# Patient Record
Sex: FEMALE | Employment: FULL TIME | URBAN - METROPOLITAN AREA
[De-identification: names, ages, dates, MRNs, and addresses within clinical notes are randomized per-mention and may not be internally consistent; named-entity substitution may affect disease eponyms.]

---

## 2022-09-06 ENCOUNTER — OFFICE VISIT (OUTPATIENT)
Dept: SURGERY | Facility: CLINIC | Age: 47
End: 2022-09-06
Payer: COMMERCIAL

## 2022-09-06 VITALS
HEIGHT: 64 IN | BODY MASS INDEX: 25.61 KG/M2 | RESPIRATION RATE: 18 BRPM | SYSTOLIC BLOOD PRESSURE: 136 MMHG | WEIGHT: 150 LBS | HEART RATE: 98 BPM | DIASTOLIC BLOOD PRESSURE: 82 MMHG | OXYGEN SATURATION: 96 %

## 2022-09-06 DIAGNOSIS — D05.12 DUCTAL CARCINOMA IN SITU (DCIS) OF LEFT BREAST: Primary | ICD-10-CM

## 2022-09-06 PROCEDURE — 99205 OFFICE O/P NEW HI 60 MIN: CPT | Performed by: SURGERY

## 2022-09-06 RX ORDER — CLINDAMYCIN AND BENZOYL PEROXIDE 10; 50 MG/G; MG/G
GEL TOPICAL 2 TIMES DAILY
Status: ON HOLD | COMMUNITY
End: 2022-10-05

## 2022-09-06 NOTE — PROGRESS NOTES
[facility] Breast Surgery Consultation    HPI:   Selma Alonzo is a 47 year old female who is seen in consultation at the request of herself for evaluation of left breast DCIS, ER positive.     She had a diagnostic mammogram on 7/21/2022 following a screening mammogram which revealed new pleomorphic calcifications n a fine linear branching pattern in the upper outer breast. She then had a stereotactic biopsy which revealed DCIS. She had a breast MRI on 8/1/2022 which revealed a hematoma at the biopsy site measuring 2.2cm with associated mild skin thickening thought to be post procedural.     She reports no breast concerns prior to her screening mammogram. She had met with Dr. Littlejohn in [place] and is trying to decide which surgical option would be best for her. She is here with her friend Mary Kay Chang (prior pt of [facility] 2018). She has genetic testing pending - expecting results next week or so. She did also meet with Dr. Fry and discussed reconstruction options. She was nervous about this and potential side effects/complications and is not sure if she would like lumpectomy vs mastectomy at this time.     Hormonal history:  menarche 14, 3 children (16,18, 20), 1st at age 27,  Pre menopausal, 10 years OCP use, no HRT, no fertility treatment.     Family history of breast cancer: No  Family history of ovarian cancer:  No  Family history of colon cancer: yes, paternal grandfather  Family history of prostate cancer: No      Past Medical History:  reviewed      Current Outpatient Medications:      clindamycin-benzoyl peroxide (BENZACLIN) 1-5 % external gel, Apply topically 2 times daily, Disp: , Rfl:     Past Surgical History:  Hip surgery, labral tear 2019  Ankle surgery  Microdiscectomy L4-5  Kidney stones        Allergies   Allergen Reactions     Rosa-Kit Bee Sting         Social History:  Social History     Socioeconomic History     Marital status:      Spouse name: Not on file     Number of children:  "Not on file     Years of education: Not on file     Highest education level: Not on file   Occupational History     Not on file   Tobacco Use     Smoking status: Never Smoker     Smokeless tobacco: Never Used   Substance and Sexual Activity     Alcohol use: Not on file     Drug use: Never     Sexual activity: Not on file   Other Topics Concern     Not on file   Social History Narrative     Not on file     Social Determinants of Health     Financial Resource Strain: Not on file   Food Insecurity: Not on file   Transportation Needs: Not on file   Physical Activity: Not on file   Stress: Not on file   Social Connections: Not on file   Intimate Partner Violence: Not on file   Housing Stability: Not on file        ROS:  The 10 point review of systems is negative other than noted in the HPI and above.    PE:  Vitals: /82   Pulse 98   Resp 18   Ht 1.626 m (5' 4\")   Wt 68 kg (150 lb)   SpO2 96%   Breastfeeding No   BMI 25.75 kg/m    General appearance: well-nourished, sitting comfortably, no apparent distress  Psych: normal affect, pleasant  HEENT:  Head normocephalic and atraumatic, pupils equal and round, conjunctivae clear, mucous membranes moist, external ears and nose normal  Neck: Supple without thyromegaly or masses  Lungs: Respirations unlabored  Lymphatic: No cervical, or supraclavicular lymphadenopathy  Extremities: Without edema  Musculoskeletal:  Normal station and gait  Neurologic: nonfocal, grossly intact times four extremities, alert and oriented times three  Psychiatric: Mood and affect are appropriate  Skin: Without lesions or rashes    Breast:  A bilateral breast exam was performed in the supine position.. Bilateral breasts were palpated in a circumferential clockwise fashion including the supraclavicular and axillary areas.   Breasts are symmetric. Skin is normal. Nipples everted. There is a moderate sized hematoma in the left upper outer breast measuring 3cm in size. No additional masses in " either breast. Tissue is heterogeneously dense    Lymph:       No supraclavicular/infraclavicular adenopathy.   Axillary adenopathy: none    Assessment:  left breast high grade DCIS, ER positive in the upper outer breast spanning 2-3cm    Plan:   Selma Alonzo is a 47 year old female has newly diagnosed left breast cancer.  I reviewed the imaging and pathology reports with her and her friend and explained the findings.  We talked about the fact that this is ductal carcinoma in situ, or noninvasive breast cancer  that is 2-3cm  in size and was found on screening mammogram.  We discussed the receptor status. We discussed that breast cancer is treated in a multidisciplinary fashion and she has already met with Dr. Espinal.     We next discussed the surgical options for treatment.  I described the procedures for RFID tag localized lumpectomy or mastectomy with sentinel lymph node biopsy, possible axillary node dissection including the details of the procedures, the risks, anesthesia and expected recovery.      I reviewed the data regarding lumpectomy and radiation vs mastectomy that shows that the local recurrence risk is slightly higher for lumpectomy and radiation vs mastectomy (3-5% vs. 1-2%), but the survival at 20 years is the same.  I also explained that since this is a small tumor and was not palpable on clinical breast exam prior to the biopsy, I would ask radiology to place a radiofrequency ID tag pre-operatively for localization. We discussed the role of oncoplastic lumpectomy. We discussed the risk of margin positivity following partial mastectomy surgery and need for possible return to the operating room for additional procedures if margins are positive.     I advised that lymph node biopsy is recommended whenever we are dealing with invasive breast cancer and described the procedure for sentinel lymph node biopsy.  I would not recommend a SLNB if proceeding with lumpectomy and if there is an invasive  component identified would recommend return to the OR in that case for SLNB but avoiding possible lymphedema if possible is my recommendation.     We talked about post-lumpectomy radiation, the course and usual side effects. We discussed that with lumpectomy, radiation is typically recommended to decrease risk of recurrence. It may be necessary following mastectomy depending on final pathology and if taty involvement is present.     We also talked about post-mastectomy reconstruction and the stages involved. We also discussed the various types of mastectomy, including total, skin-sparing, and nipple-sparing mastectomy.  We reviewed that the nipple-sparing technique is cosmetic; sensation and contractility will likely be lost.  Selma  is a candidate for nipple-sparing mastectomy from an oncologic perspective.  The option of having immediate versus delayed reconstruction was also discussed.   We reviewed that the advantages of immediate reconstruction includes superior cosmetics, as the skin is preserved.      We did discuss that if she had bilateral mastectomies and no invasive component identified, I do not expect she would need any hormone blocking medication in an adjuvant setting.     In addition, I have recommended genetic counseling, this is pending currently.        Plan:   Pt to decide which surgical option she would like to proceed with and will let my  know if she would like to schedule      60 minutes total time spent on the date of this encounter doing: chart review, review of test results, patient visit, physical exam, education, counseling, developing plan of care, and documenting.    Meme Ortega MD      Please route or send letter to:  Primary Care Provider (PCP) and Referring Provider

## 2022-09-06 NOTE — NURSING NOTE
Breast Nurse Care Coordination:      I introduced self to patient, and explained my role of breast nurse coordinator. I accompanied Selma to her surgical consultation on 9/6/22 with Dr. Ortega at the [facility] [place].      Selma was given the new breast cancer patient packet which includes educational material and support resources such as [employer], American Cancer Society, Caring [employer], Fighting Cancer through Diet and Lifestyle, [employer], [employer], Pathways,  and the [facility] Breast Cancer Support Group.  I also enclosed a copy of her left breast biopsy pathology report(7/26/22).       At the end of the consultation, we reviewed Selma's plan of care and education.  The plan is for patient to schedule surgery. She has met with Dr. Espinal at [place] and is seeing Dr. Ortega today as a second opinion. She is leaning towards a left lumpectomy (depending on her genetics which is pending). She will reach out to our office if she decides to purse surgery here. I ifnformed her for surgery she will need a COVID test 1-2 days before surgery (at home & take a picture to show pre-op nurse day of surgery) and a pre-op exam with her primary care provider within 30 days before surgery.     I gave patient Abril educational materials regarding Exercises After Breast Surgery, [place] Lymph Node Biopsy, and Lumpectomy.  Informed patient for lumpectomy surgery, she will want to have two good supportive sports bras that open in the front to wear the 2 weeks following her surgery. I gave patient information on different options to purchase sports bras.      I answered her questions and encouraged patient to call me back with any future questions or concerns.  Selma has my contact information, and knows to contact me in the future with any questions or concerns.     Anjelica Fry 9/6/2022 3:35 PM      Anjelica Fry RN BSN  Breast Care Nurse Coordinator  [facility]  [facility]  [facility] [place]  [phone]

## 2022-09-06 NOTE — LETTER
September 7, 2022          Gabriela Otto MD      RE:   Selma Alonzo 1975      Dear Colleague,    Thank you for referring your patient, Selma Alonzo, to [facility]. Please see a copy of my visit note below.    Selma Alonzo is a 47 year old female who is seen in consultation at the request of herself for evaluation of left breast DCIS, ER positive.      She had a diagnostic mammogram on 7/21/2022 following a screening mammogram which revealed new pleomorphic calcifications n a fine linear branching pattern in the upper outer breast. She then had a stereotactic biopsy which revealed DCIS. She had a breast MRI on 8/1/2022 which revealed a hematoma at the biopsy site measuring 2.2cm with associated mild skin thickening thought to be post procedural.      She reports no breast concerns prior to her screening mammogram. She had met with Dr. Littlejohn in [place] and is trying to decide which surgical option would be best for her. She is here with her friend Mary Kay Chang (prior pt of [facility] 2018). She has genetic testing pending - expecting results next week or so. She did also meet with Dr. Fry and discussed reconstruction options. She was nervous about this and potential side effects/complications and is not sure if she would like lumpectomy vs mastectomy at this time.      Hormonal history:  menarche 14, 3 children (16,18, 20), 1st at age 27,  Pre menopausal, 10 years OCP use, no HRT, no fertility treatment.      Family history of breast cancer: No  Family history of ovarian cancer:  No  Family history of colon cancer: yes, paternal grandfather  Family history of prostate cancer: No        Past Medical History:  reviewed        Current Outpatient Medications:      clindamycin-benzoyl peroxide (BENZACLIN) 1-5 % external gel, Apply topically 2 times daily, Disp: , Rfl:      Past Surgical History:  Hip surgery, labral tear 2019  Ankle surgery  Microdiscectomy L4-5  Kidney stones     "             Allergies   Allergen Reactions     Rosa-Kit Bee Sting                       Social History:  Social History   Social History            Socioeconomic History     Marital status:        Spouse name: Not on file     Number of children: Not on file     Years of education: Not on file     Highest education level: Not on file   Occupational History     Not on file   Tobacco Use     Smoking status: Never Smoker     Smokeless tobacco: Never Used   Substance and Sexual Activity     Alcohol use: Not on file     Drug use: Never     Sexual activity: Not on file   Other Topics Concern     Not on file   Social History Narrative     Not on file      Social Determinants of Health      Financial Resource Strain: Not on file   Food Insecurity: Not on file   Transportation Needs: Not on file   Physical Activity: Not on file   Stress: Not on file   Social Connections: Not on file   Intimate Partner Violence: Not on file   Housing Stability: Not on file                        ROS:  The 10 point review of systems is negative other than noted in the HPI and above.     PE:  Vitals: /82   Pulse 98   Resp 18   Ht 1.626 m (5' 4\")   Wt 68 kg (150 lb)   SpO2 96%   Breastfeeding No   BMI 25.75 kg/m    General appearance: well-nourished, sitting comfortably, no apparent distress  Psych: normal affect, pleasant  HEENT:  Head normocephalic and atraumatic, pupils equal and round, conjunctivae clear, mucous membranes moist, external ears and nose normal  Neck: Supple without thyromegaly or masses  Lungs: Respirations unlabored  Lymphatic: No cervical, or supraclavicular lymphadenopathy  Extremities: Without edema  Musculoskeletal:  Normal station and gait  Neurologic: nonfocal, grossly intact times four extremities, alert and oriented times three  Psychiatric: Mood and affect are appropriate  Skin: Without lesions or rashes     Breast:  A bilateral breast exam was performed in the supine position.. Bilateral breasts " were palpated in a circumferential clockwise fashion including the supraclavicular and axillary areas.   Breasts are symmetric. Skin is normal. Nipples everted. There is a moderate sized hematoma in the left upper outer breast measuring 3cm in size. No additional masses in either breast. Tissue is heterogeneously dense     Lymph:                            No supraclavicular/infraclavicular adenopathy.               Axillary adenopathy: none     Assessment:  left breast high grade DCIS, ER positive in the upper outer breast spanning 2-3cm     Plan:   Selma Alonzo is a 47 year old female has newly diagnosed left breast cancer.  I reviewed the imaging and pathology reports with her and her friend and explained the findings.  We talked about the fact that this is ductal carcinoma in situ, or noninvasive breast cancer  that is 2-3cm  in size and was found on screening mammogram.  We discussed the receptor status. We discussed that breast cancer is treated in a multidisciplinary fashion and she has already met with Dr. Espinal.      We next discussed the surgical options for treatment.  I described the procedures for RFID tag localized lumpectomy or mastectomy with sentinel lymph node biopsy, possible axillary node dissection including the details of the procedures, the risks, anesthesia and expected recovery.       I reviewed the data regarding lumpectomy and radiation vs mastectomy that shows that the local recurrence risk is slightly higher for lumpectomy and radiation vs mastectomy (3-5% vs. 1-2%), but the survival at 20 years is the same.  I also explained that since this is a small tumor and was not palpable on clinical breast exam prior to the biopsy, I would ask radiology to place a radiofrequency ID tag pre-operatively for localization. We discussed the role of oncoplastic lumpectomy. We discussed the risk of margin positivity following partial mastectomy surgery and need for possible return to the operating  room for additional procedures if margins are positive.      I advised that lymph node biopsy is recommended whenever we are dealing with invasive breast cancer and described the procedure for sentinel lymph node biopsy.  I would not recommend a SLNB if proceeding with lumpectomy and if there is an invasive component identified would recommend return to the OR in that case for SLNB but avoiding possible lymphedema if possible is my recommendation.      We talked about post-lumpectomy radiation, the course and usual side effects. We discussed that with lumpectomy, radiation is typically recommended to decrease risk of recurrence. It may be necessary following mastectomy depending on final pathology and if taty involvement is present.      We also talked about post-mastectomy reconstruction and the stages involved. We also discussed the various types of mastectomy, including total, skin-sparing, and nipple-sparing mastectomy.  We reviewed that the nipple-sparing technique is cosmetic; sensation and contractility will likely be lost.  Selma  is a candidate for nipple-sparing mastectomy from an oncologic perspective.  The option of having immediate versus delayed reconstruction was also discussed.   We reviewed that the advantages of immediate reconstruction includes superior cosmetics, as the skin is preserved.       We did discuss that if she had bilateral mastectomies and no invasive component identified, I do not expect she would need any hormone blocking medication in an adjuvant setting.      In addition, I have recommended genetic counseling, this is pending currently.         Plan:   Pt to decide which surgical option she would like to proceed with and will let my  know if she would like to schedule    Again, thank you for allowing me to participate in the care of your patient.      Sincerely,      Meme Ortega MD

## 2022-09-08 ENCOUNTER — PREP FOR PROCEDURE (OUTPATIENT)
Dept: SURGERY | Facility: CLINIC | Age: 47
End: 2022-09-08

## 2022-09-08 DIAGNOSIS — D05.12 DUCTAL CARCINOMA IN SITU (DCIS) OF LEFT BREAST: Primary | ICD-10-CM

## 2022-09-09 ENCOUNTER — TELEPHONE (OUTPATIENT)
Dept: SURGERY | Facility: CLINIC | Age: 47
End: 2022-09-09

## 2022-09-09 NOTE — TELEPHONE ENCOUNTER
Type of surgery: RFID localized left breast lumpectomy  Location of surgery: [facility]  Date and time of surgery: 10/5/22 at 9:30am  Surgeon: Dr. Meme Ortega  Pre-Op Appt Date: patient to schedule  Post-Op Appt Date: patient to schedule   Packet sent out: Yes  Pre-cert/Authorization completed:  Not Applicable  Date: 9/9/22

## 2022-09-30 ENCOUNTER — HOSPITAL ENCOUNTER (OUTPATIENT)
Dept: MAMMOGRAPHY | Facility: CLINIC | Age: 47
Discharge: HOME | End: 2022-09-30
Attending: SURGERY
Payer: COMMERCIAL

## 2022-09-30 DIAGNOSIS — R92.1 BREAST CALCIFICATION, LEFT: ICD-10-CM

## 2022-09-30 DIAGNOSIS — D05.12 DUCTAL CARCINOMA IN SITU (DCIS) OF LEFT BREAST: ICD-10-CM

## 2022-09-30 PROCEDURE — 250N000009 HC RX 250: Performed by: RADIOLOGY

## 2022-09-30 PROCEDURE — A4648 IMPLANTABLE TISSUE MARKER: HCPCS

## 2022-09-30 PROCEDURE — 77065 DX MAMMO INCL CAD UNI: CPT | Mod: LT

## 2022-09-30 PROCEDURE — 999N000065 MA POST PROCEDURE LEFT

## 2022-09-30 RX ADMIN — LIDOCAINE HYDROCHLORIDE 5 ML: 10 INJECTION, SOLUTION INFILTRATION; PERINEURAL at 08:05

## 2022-10-03 ENCOUNTER — TELEPHONE (OUTPATIENT)
Dept: SURGERY | Facility: CLINIC | Age: 47
End: 2022-10-03

## 2022-10-03 NOTE — TELEPHONE ENCOUNTER
Breast Care Nurse Coordination:  Preoperative call placed to Selma today to assess her needs, and check in on whether she has any questions or concerns regarding her upcoming breast surgery.    Patient was recently diagnosed with left DCIS and is scheduled to have a left breast lumpetomy by Dr. Meme Ortega on 10/5/22 at the [facility].    Selma states she has had a preop physical exam with her primary provider- on 9/30/22, and has already picked up her sports bras.  I informed patient to bring one of the sports bras with her to the hospital the day of surgery.  We discussed the day of surgery and what to expect the days and weeks following.  I informed patient to wear loose, comfortable fitted clothing.     I answered all of patient's questions completely and thoroughly to her satisfaction.  I informed patient that I will reach out to her next week to check in with her, or she can always call me back with any questions or concerns.  Patient verbalized understanding and agrees with the plan of care.      She is scheduled for a post op on 10/25/22 @ 12:15pm (arrive 12pm) with Dr. Meme [facility]ga [facility]    She would like to see Dr. Espinal (medical oncology) after surgery and is set to see him on October 13th for now.   She has met with Dr. Gotti (Radiation oncology) in [place] and plans on having her radiation treatment done there.      Anjelica Fry 10/3/2022 10:17 AM      Anjelica Fry RN BSN  Breast Care Nurse Coordinator  [facility] [place] [place]  [phone]

## 2022-10-04 ENCOUNTER — ANESTHESIA EVENT (OUTPATIENT)
Dept: SURGERY | Facility: CLINIC | Age: 47
End: 2022-10-04
Payer: COMMERCIAL

## 2022-10-04 RX ORDER — TRIAMCINOLONE ACETONIDE 1 MG/ML
LOTION TOPICAL 3 TIMES DAILY
Status: ON HOLD | COMMUNITY
End: 2022-10-05

## 2022-10-04 RX ORDER — EPINEPHRINE 0.15 MG/.3ML
0.15 INJECTION INTRAMUSCULAR PRN
COMMUNITY

## 2022-10-04 RX ORDER — BETAMETHASONE DIPROPIONATE 0.5 MG/G
CREAM TOPICAL 2 TIMES DAILY
COMMUNITY

## 2022-10-05 ENCOUNTER — HOSPITAL ENCOUNTER (OUTPATIENT)
Facility: CLINIC | Age: 47
Discharge: HOME | End: 2022-10-05
Attending: SURGERY | Admitting: SURGERY
Payer: COMMERCIAL

## 2022-10-05 ENCOUNTER — HOSPITAL ENCOUNTER (OUTPATIENT)
Dept: MAMMOGRAPHY | Facility: CLINIC | Age: 47
Discharge: HOME | End: 2022-10-05
Attending: SURGERY
Payer: COMMERCIAL

## 2022-10-05 ENCOUNTER — ANESTHESIA (OUTPATIENT)
Dept: SURGERY | Facility: CLINIC | Age: 47
End: 2022-10-05
Payer: COMMERCIAL

## 2022-10-05 ENCOUNTER — APPOINTMENT (OUTPATIENT)
Dept: SURGERY | Facility: PHYSICIAN GROUP | Age: 47
End: 2022-10-05
Payer: COMMERCIAL

## 2022-10-05 VITALS
TEMPERATURE: 98.4 F | BODY MASS INDEX: 25.1 KG/M2 | OXYGEN SATURATION: 96 % | RESPIRATION RATE: 19 BRPM | SYSTOLIC BLOOD PRESSURE: 128 MMHG | HEIGHT: 64 IN | WEIGHT: 147 LBS | HEART RATE: 88 BPM | DIASTOLIC BLOOD PRESSURE: 90 MMHG

## 2022-10-05 DIAGNOSIS — G89.18 POSTOPERATIVE PAIN: Primary | ICD-10-CM

## 2022-10-05 DIAGNOSIS — D05.12 DUCTAL CARCINOMA IN SITU (DCIS) OF LEFT BREAST: ICD-10-CM

## 2022-10-05 PROCEDURE — 250N000011 HC RX IP 250 OP 636: Performed by: SURGERY

## 2022-10-05 PROCEDURE — 370N000017 HC ANESTHESIA TECHNICAL FEE, PER MIN: Performed by: SURGERY

## 2022-10-05 PROCEDURE — 19301 PARTIAL MASTECTOMY: CPT | Mod: LT | Performed by: SURGERY

## 2022-10-05 PROCEDURE — 250N000025 HC SEVOFLURANE, PER MIN: Performed by: SURGERY

## 2022-10-05 PROCEDURE — 250N000013 HC RX MED GY IP 250 OP 250 PS 637: Performed by: ANESTHESIOLOGY

## 2022-10-05 PROCEDURE — 250N000011 HC RX IP 250 OP 636: Performed by: NURSE ANESTHETIST, CERTIFIED REGISTERED

## 2022-10-05 PROCEDURE — 258N000003 HC RX IP 258 OP 636: Performed by: ANESTHESIOLOGY

## 2022-10-05 PROCEDURE — 999N000141 HC STATISTIC PRE-PROCEDURE NURSING ASSESSMENT: Performed by: SURGERY

## 2022-10-05 PROCEDURE — 710N000009 HC RECOVERY PHASE 1, LEVEL 1, PER MIN: Performed by: SURGERY

## 2022-10-05 PROCEDURE — 250N000011 HC RX IP 250 OP 636: Performed by: ANESTHESIOLOGY

## 2022-10-05 PROCEDURE — 258N000003 HC RX IP 258 OP 636: Performed by: NURSE ANESTHETIST, CERTIFIED REGISTERED

## 2022-10-05 PROCEDURE — 88307 TISSUE EXAM BY PATHOLOGIST: CPT | Mod: 26 | Performed by: PATHOLOGY

## 2022-10-05 PROCEDURE — 88305 TISSUE EXAM BY PATHOLOGIST: CPT | Mod: TC | Performed by: SURGERY

## 2022-10-05 PROCEDURE — 272N000001 HC OR GENERAL SUPPLY STERILE: Performed by: SURGERY

## 2022-10-05 PROCEDURE — 360N000081 HC SURGERY LEVEL 1 W/ FLUORO, PER MIN: Performed by: SURGERY

## 2022-10-05 PROCEDURE — 88342 IMHCHEM/IMCYTCHM 1ST ANTB: CPT | Mod: 26 | Performed by: PATHOLOGY

## 2022-10-05 PROCEDURE — 710N000012 HC RECOVERY PHASE 2, PER MINUTE: Performed by: SURGERY

## 2022-10-05 PROCEDURE — 88341 IMHCHEM/IMCYTCHM EA ADD ANTB: CPT | Mod: 26 | Performed by: PATHOLOGY

## 2022-10-05 PROCEDURE — 250N000009 HC RX 250: Performed by: NURSE ANESTHETIST, CERTIFIED REGISTERED

## 2022-10-05 PROCEDURE — 88305 TISSUE EXAM BY PATHOLOGIST: CPT | Mod: 26 | Performed by: PATHOLOGY

## 2022-10-05 PROCEDURE — 250N000009 HC RX 250: Performed by: SURGERY

## 2022-10-05 PROCEDURE — 999N000104 MA BREAST SPECIMEN LEFT OR

## 2022-10-05 RX ORDER — SODIUM CHLORIDE, SODIUM LACTATE, POTASSIUM CHLORIDE, CALCIUM CHLORIDE 600; 310; 30; 20 MG/100ML; MG/100ML; MG/100ML; MG/100ML
INJECTION, SOLUTION INTRAVENOUS CONTINUOUS
Status: DISCONTINUED | OUTPATIENT
Start: 2022-10-05 | End: 2022-10-05 | Stop reason: HOSPADM

## 2022-10-05 RX ORDER — HYDROCODONE BITARTRATE AND ACETAMINOPHEN 5; 325 MG/1; MG/1
1-2 TABLET ORAL EVERY 4 HOURS PRN
Qty: 12 TABLET | Refills: 0 | Status: SHIPPED | OUTPATIENT
Start: 2022-10-05 | End: 2023-06-02

## 2022-10-05 RX ORDER — ONDANSETRON 2 MG/ML
4 INJECTION INTRAMUSCULAR; INTRAVENOUS EVERY 30 MIN PRN
Status: DISCONTINUED | OUTPATIENT
Start: 2022-10-05 | End: 2022-10-05 | Stop reason: HOSPADM

## 2022-10-05 RX ORDER — FENTANYL CITRATE 0.05 MG/ML
50 INJECTION, SOLUTION INTRAMUSCULAR; INTRAVENOUS EVERY 5 MIN PRN
Status: DISCONTINUED | OUTPATIENT
Start: 2022-10-05 | End: 2022-10-05 | Stop reason: HOSPADM

## 2022-10-05 RX ORDER — CEFAZOLIN SODIUM/WATER 2 G/20 ML
2 SYRINGE (ML) INTRAVENOUS
Status: COMPLETED | OUTPATIENT
Start: 2022-10-05 | End: 2022-10-05

## 2022-10-05 RX ORDER — PROPOFOL 10 MG/ML
INJECTION, EMULSION INTRAVENOUS PRN
Status: DISCONTINUED | OUTPATIENT
Start: 2022-10-05 | End: 2022-10-05

## 2022-10-05 RX ORDER — MEPERIDINE HYDROCHLORIDE 25 MG/ML
12.5 INJECTION INTRAMUSCULAR; INTRAVENOUS; SUBCUTANEOUS
Status: DISCONTINUED | OUTPATIENT
Start: 2022-10-05 | End: 2022-10-05 | Stop reason: HOSPADM

## 2022-10-05 RX ORDER — AMOXICILLIN 250 MG
1-2 CAPSULE ORAL 2 TIMES DAILY PRN
Qty: 15 TABLET | Refills: 0 | Status: SHIPPED | OUTPATIENT
Start: 2022-10-05 | End: 2023-06-02

## 2022-10-05 RX ORDER — CEFAZOLIN SODIUM/WATER 2 G/20 ML
2 SYRINGE (ML) INTRAVENOUS SEE ADMIN INSTRUCTIONS
Status: DISCONTINUED | OUTPATIENT
Start: 2022-10-05 | End: 2022-10-05 | Stop reason: HOSPADM

## 2022-10-05 RX ORDER — FENTANYL CITRATE 50 UG/ML
INJECTION, SOLUTION INTRAMUSCULAR; INTRAVENOUS PRN
Status: DISCONTINUED | OUTPATIENT
Start: 2022-10-05 | End: 2022-10-05

## 2022-10-05 RX ORDER — FENTANYL CITRATE 0.05 MG/ML
25 INJECTION, SOLUTION INTRAMUSCULAR; INTRAVENOUS
Status: CANCELLED | OUTPATIENT
Start: 2022-10-05

## 2022-10-05 RX ORDER — DEXAMETHASONE SODIUM PHOSPHATE 4 MG/ML
INJECTION, SOLUTION INTRA-ARTICULAR; INTRALESIONAL; INTRAMUSCULAR; INTRAVENOUS; SOFT TISSUE PRN
Status: DISCONTINUED | OUTPATIENT
Start: 2022-10-05 | End: 2022-10-05

## 2022-10-05 RX ORDER — LIDOCAINE HYDROCHLORIDE 20 MG/ML
INJECTION, SOLUTION INFILTRATION; PERINEURAL PRN
Status: DISCONTINUED | OUTPATIENT
Start: 2022-10-05 | End: 2022-10-05

## 2022-10-05 RX ORDER — PROPOFOL 10 MG/ML
INJECTION, EMULSION INTRAVENOUS CONTINUOUS PRN
Status: DISCONTINUED | OUTPATIENT
Start: 2022-10-05 | End: 2022-10-05

## 2022-10-05 RX ORDER — HYDROCODONE BITARTRATE AND ACETAMINOPHEN 5; 325 MG/1; MG/1
1-2 TABLET ORAL
Status: DISCONTINUED | OUTPATIENT
Start: 2022-10-05 | End: 2022-10-05 | Stop reason: HOSPADM

## 2022-10-05 RX ORDER — HYDROMORPHONE HCL IN WATER/PF 6 MG/30 ML
0.2 PATIENT CONTROLLED ANALGESIA SYRINGE INTRAVENOUS EVERY 5 MIN PRN
Status: DISCONTINUED | OUTPATIENT
Start: 2022-10-05 | End: 2022-10-05 | Stop reason: HOSPADM

## 2022-10-05 RX ORDER — MAGNESIUM HYDROXIDE 1200 MG/15ML
LIQUID ORAL PRN
Status: DISCONTINUED | OUTPATIENT
Start: 2022-10-05 | End: 2022-10-05 | Stop reason: HOSPADM

## 2022-10-05 RX ORDER — ONDANSETRON 2 MG/ML
INJECTION INTRAMUSCULAR; INTRAVENOUS PRN
Status: DISCONTINUED | OUTPATIENT
Start: 2022-10-05 | End: 2022-10-05

## 2022-10-05 RX ORDER — ONDANSETRON 4 MG/1
4 TABLET, ORALLY DISINTEGRATING ORAL EVERY 30 MIN PRN
Status: DISCONTINUED | OUTPATIENT
Start: 2022-10-05 | End: 2022-10-05 | Stop reason: HOSPADM

## 2022-10-05 RX ORDER — OXYCODONE HYDROCHLORIDE 5 MG/1
5 TABLET ORAL EVERY 4 HOURS PRN
Status: DISCONTINUED | OUTPATIENT
Start: 2022-10-05 | End: 2022-10-05 | Stop reason: HOSPADM

## 2022-10-05 RX ADMIN — PROPOFOL 200 MG: 10 INJECTION, EMULSION INTRAVENOUS at 09:40

## 2022-10-05 RX ADMIN — PHENYLEPHRINE HYDROCHLORIDE 50 MCG: 10 INJECTION INTRAVENOUS at 10:36

## 2022-10-05 RX ADMIN — PHENYLEPHRINE HYDROCHLORIDE 50 MCG: 10 INJECTION INTRAVENOUS at 10:17

## 2022-10-05 RX ADMIN — Medication 2 G: at 09:32

## 2022-10-05 RX ADMIN — FENTANYL CITRATE 50 MCG: 50 INJECTION, SOLUTION INTRAMUSCULAR; INTRAVENOUS at 09:40

## 2022-10-05 RX ADMIN — PROPOFOL 30 MG: 10 INJECTION, EMULSION INTRAVENOUS at 09:59

## 2022-10-05 RX ADMIN — PROPOFOL 100 MCG/KG/MIN: 10 INJECTION, EMULSION INTRAVENOUS at 09:42

## 2022-10-05 RX ADMIN — LIDOCAINE HYDROCHLORIDE 100 MG: 20 INJECTION, SOLUTION INFILTRATION; PERINEURAL at 09:40

## 2022-10-05 RX ADMIN — FENTANYL CITRATE 25 MCG: 50 INJECTION, SOLUTION INTRAMUSCULAR; INTRAVENOUS at 09:59

## 2022-10-05 RX ADMIN — OXYCODONE HYDROCHLORIDE 5 MG: 5 TABLET ORAL at 11:37

## 2022-10-05 RX ADMIN — SODIUM CHLORIDE, POTASSIUM CHLORIDE, SODIUM LACTATE AND CALCIUM CHLORIDE: 600; 310; 30; 20 INJECTION, SOLUTION INTRAVENOUS at 08:19

## 2022-10-05 RX ADMIN — PHENYLEPHRINE HYDROCHLORIDE 50 MCG: 10 INJECTION INTRAVENOUS at 09:53

## 2022-10-05 RX ADMIN — ONDANSETRON 4 MG: 2 INJECTION INTRAMUSCULAR; INTRAVENOUS at 10:28

## 2022-10-05 RX ADMIN — MIDAZOLAM 2 MG: 1 INJECTION INTRAMUSCULAR; INTRAVENOUS at 09:34

## 2022-10-05 RX ADMIN — DEXAMETHASONE SODIUM PHOSPHATE 4 MG: 4 INJECTION, SOLUTION INTRA-ARTICULAR; INTRALESIONAL; INTRAMUSCULAR; INTRAVENOUS; SOFT TISSUE at 09:49

## 2022-10-05 RX ADMIN — FENTANYL CITRATE 25 MCG: 50 INJECTION, SOLUTION INTRAMUSCULAR; INTRAVENOUS at 09:49

## 2022-10-05 RX ADMIN — FENTANYL CITRATE 50 MCG: 50 INJECTION, SOLUTION INTRAMUSCULAR; INTRAVENOUS at 11:20

## 2022-10-05 ASSESSMENT — ACTIVITIES OF DAILY LIVING (ADL)
ADLS_ACUITY_SCORE: 35
ADLS_ACUITY_SCORE: 35

## 2022-10-05 ASSESSMENT — COPD QUESTIONNAIRES: COPD: 0

## 2022-10-05 NOTE — OP NOTE
[facility] Breast Surgery Operative Note      Pre-operative diagnosis: Left breast DCIS   Post-operative diagnosis: Same, pathology pending     Procedure: 1.  LEFT SEED LOCALIZED PARTIAL MASTECTOMY         Surgeon: Meme Ortega MD   Assistant(s):  Vicky Ken PA-C  The PA s assistance was medically necessary to provide adequate exposure in the operating field, maintain hemostasis, cutting suture, clamping and ligating bleeding vessels, and visualization of anatomic structures throughout the surgical procedure.      Anesthesia: General    Estimated blood loss:   5 cc     Specimens: ID Type Source Tests Collected by Time Destination   1 : LEFT BREAST TAG LOCALIZED LUMPECTOMY  Tissue Breast, Left SURGICAL PATHOLOGY EXAM Meme Ortega MD 10/5/2022 10:01 AM    2 : LEFT BREAST NEW SUPERIOR MARGIN, INK AT NEW MARGIN Tissue Breast, Left SURGICAL PATHOLOGY EXAM Meme Ortega MD 10/5/2022 10:23 AM    3 : LEFT BREAST NEW INFERIOR MARGIN, INK AT NEW MARGIN Tissue Breast, Left SURGICAL PATHOLOGY EXAM Meme Ortega MD 10/5/2022 10:24 AM    4 : LEFT BREAST NEW LATERAL MARGIN, INK AT NEW MARGIN Tissue Breast, Left SURGICAL PATHOLOGY EXAM Meme Ortega MD 10/5/2022 10:24 AM    5 : LEFT BREAST NEW POSTERIOR MARGIN, INK AT NEW MARGIN Tissue Breast, Left SURGICAL PATHOLOGY EXAM Meme Ortega MD 10/5/2022 10:24 AM    6 : LEFT BREAST NEW MEDIAL MARGIN, INK AT NEW MARGIN Tissue Breast, Left SURGICAL PATHOLOGY EXAM Meme Ortega MD 10/5/2022 10:24 AM         INDICATION:  Selma is a 47yof who presented with left breast ER positive DCIS. She had a diagnostic mammogram on 7/21/2022 following a screening mammogram which revealed new pleomorphic calcifications n a fine linear branching pattern in the upper outer breast. She elected to proceed with tag localized lumpecotmy.     DESCRIPTION OF PROCEDURE: The patient was placed on the table in supine position. General anesthetic was induced. Perioperative antibiotics  were given.  The left breast and axilla were prepped and draped in standard sterile fashion.  We used the localizer seed placed in the Breast Center as well as the post-seed mammograms to localize the area of interest. We made a periareolar incision centered at the 2-4 o'clock position. We created skin flaps along the anterior mammary fascial plane circumferentially using cautery. We carried the incision down using electrocautery into the breast tissue and excised the area of interest, including the seed.  The localizer probe was used to guide the dissection. The area was removed in its entirety with some surrounding benign appearing breast tissue. The surrounding breast tissue was extremely dense. There was also ongoing residual hematoma present from her initial biopsy.  After the specimen was removed it had a high signal with the localizer probe. Once the mass was removed, it was oriented with Rendon dyes. A specimen mammogram was obtained and revealed localizer tag. The specimen was then sent to pathology for review.  I elected to excise shave margins from the superior, inferior, medial, lateral and posterior aspects given the extreme breast density. These were oriented with ink at the new margin. The wound was then examined for bleeding and hemostasis was achieved using electrocautery.    Clips were placed at the 12, 3, 6, and 9 o'clock positions of the lumpectomy cavity as well as posterior.  The lumpectomy cavity was reapproximated with several interrupted 3-0 vicryl sutures. The skin was closed with a deep dermal 3-0 vicryl and running 4-0 Monocryl subcuticular suture and steri strips.   The patient tolerated the procedure well.  Sponge and instrument counts were correct.    Meme Ortega MD  [facility]  [phone]

## 2022-10-05 NOTE — INTERVAL H&P NOTE
"I have reviewed the surgical (or preoperative) H&P that is linked to this encounter, and examined the patient. There are no significant changes    Clinical Conditions Present on Arrival:  Clinically Significant Risk Factors Present on Admission                   # Overweight: Estimated body mass index is 25.23 kg/m  as calculated from the following:    Height as of this encounter: 1.626 m (5' 4\").    Weight as of this encounter: 66.7 kg (147 lb).       "

## 2022-10-05 NOTE — PROGRESS NOTES
Patient declines hcg test stating she just took one 9/30/22 with her pre-op physical. RN observes negative Hcg test noted in 9/30/22 H&P, ANDREEA Bernardo to be notified via [employer].

## 2022-10-05 NOTE — ANESTHESIA PROCEDURE NOTES
Airway       Patient location during procedure: OR ([facility] - Operating Room or Procedural Area)  Staff -        Anesthesiologist:  Dalila Bernardo       Resident/Fellow: Vicky Guallpa       CRNA: Ember Sanders APRN CRNA       Performed By: SRNA  Consent for Airway        Urgency: elective  Indications and Patient Condition       Indications for airway management: lyric-procedural       Induction type:intravenous       Mask difficulty assessment: 0 - not attempted    Final Airway Details       Final airway type: supraglottic airway    Supraglottic Airway Details        Type: LMA       Brand: I-Gel       LMA size: 4    Post intubation assessment        Number of attempts at approach: 1       Number of other approaches attempted: 0       Secured with: cloth tape       Ease of procedure: easy       Dentition: Intact and Unchanged

## 2022-10-05 NOTE — ANESTHESIA CARE TRANSFER NOTE
Patient: Selma Alonzo    Procedure: Procedure(s):  left breast tag localized lumpectomy       Diagnosis: Ductal carcinoma in situ (DCIS) of left breast [D05.12]  Diagnosis Additional Information: No value filed.    Anesthesia Type:   General     Note:    Oropharynx: oropharynx clear of all foreign objects and spontaneously breathing  Level of Consciousness: awake  Oxygen Supplementation: face mask  Level of Supplemental Oxygen (L/min / FiO2): 6  Independent Airway: airway patency satisfactory and stable  Dentition: dentition unchanged  Vital Signs Stable: post-procedure vital signs reviewed and stable  Report to RN Given: handoff report given  Patient transferred to: PACU    Handoff Report: Identifed the Patient, Identified the Reponsible Provider, Reviewed the pertinent medical history, Discussed the surgical course, Reviewed Intra-OP anesthesia mangement and issues during anesthesia, Set expectations for post-procedure period and Allowed opportunity for questions and acknowledgement of understanding      Vitals:  Vitals Value Taken Time   /79 10/05/22 1054   Temp     Pulse 104 10/05/22 1054   Resp 17 10/05/22 1056   SpO2 100 % 10/05/22 1056   Vitals shown include unvalidated device data.    Electronically Signed By: AMENA Vegas CRNA  October 5, 2022  10:57 AM

## 2022-10-05 NOTE — ANESTHESIA PREPROCEDURE EVALUATION
Anesthesia Pre-Procedure Evaluation    Patient: Selma Alonzo   MRN: [id number] : 1975        Procedure : Procedure(s):  left breast tag localized lumpectomy          Past Medical History:   Diagnosis Date     Breast cancer (H)      Dermatitis       Past Surgical History:   Procedure Laterality Date     ANKLE SURGERY       BACK SURGERY       CYSTOSCOPY       EYE SURGERY       HIP SURGERY       MOUTH SURGERY        Allergies   Allergen Reactions     Rosa-Kit Bee Sting      Bees       Social History     Tobacco Use     Smoking status: Never Smoker     Smokeless tobacco: Never Used   Substance Use Topics     Alcohol use: Not on file      Wt Readings from Last 1 Encounters:   10/05/22 66.7 kg (147 lb)        Anesthesia Evaluation   Pt has had prior anesthetic.     No history of anesthetic complications       ROS/MED HX  ENT/Pulmonary:    (-) asthma, COPD and sleep apnea   Neurologic:       Cardiovascular:       METS/Exercise Tolerance:     Hematologic:       Musculoskeletal:       GI/Hepatic:    (-) GERD   Renal/Genitourinary:       Endo:    (-) Type II DM and thyroid disease   Psychiatric/Substance Use:       Infectious Disease:       Malignancy:   (+) Malignancy, History of Breast.    Other:            Physical Exam    Airway        Mallampati: I   TM distance: > 3 FB   Neck ROM: full   Mouth opening: > 3 cm    Respiratory Devices and Support         Dental  no notable dental history         Cardiovascular   cardiovascular exam normal          Pulmonary   pulmonary exam normal                OUTSIDE LABS:  CBC: No results found for: WBC, HGB, HCT, PLT  BMP: No results found for: NA, POTASSIUM, CHLORIDE, CO2, BUN, CR, GLC  COAGS: No results found for: PTT, INR, FIBR  POC: No results found for: BGM, HCG, HCGS  HEPATIC: No results found for: ALBUMIN, PROTTOTAL, ALT, AST, GGT, ALKPHOS, BILITOTAL, BILIDIRECT, MARLI  OTHER: No results found for: PH, LACT, A1C, RAYMUNDO, PHOS, MAG, LIPASE, AMYLASE, TSH, T4, T3, CRP,  SED    Anesthesia Plan    ASA Status:  2      Anesthesia Type: General.     - Airway: LMA      Maintenance: Balanced.        Consents    Anesthesia Plan(s) and associated risks, benefits, and realistic alternatives discussed. Questions answered and patient/representative(s) expressed understanding.    - Discussed:     - Discussed with:  Patient         Postoperative Care    Pain management: IV analgesics, Oral pain medications.   PONV prophylaxis: Ondansetron (or other 5HT-3), Dexamethasone or Solumedrol, Background Propofol Infusion     Comments:                Dalila Bernardo

## 2022-10-05 NOTE — ANESTHESIA POSTPROCEDURE EVALUATION
Patient: Selma Alonzo    Procedure: Procedure(s):  left breast tag localized lumpectomy       Anesthesia Type:  General    Note:  Disposition: Outpatient   Postop Pain Control: Uneventful            Sign Out: Well controlled pain   PONV: No   Neuro/Psych: Uneventful            Sign Out: Acceptable/Baseline neuro status   Airway/Respiratory: Uneventful            Sign Out: Acceptable/Baseline resp. status   CV/Hemodynamics: Uneventful            Sign Out: Acceptable CV status; No obvious hypovolemia; No obvious fluid overload   Other NRE: NONE   DID A NON-ROUTINE EVENT OCCUR?            Last vitals:  Vitals Value Taken Time   /90 10/05/22 1145   Temp 36.9  C (98.4  F) 10/05/22 1156   Pulse 92 10/05/22 1156   Resp 19 10/05/22 1156   SpO2 96 % 10/05/22 1156   Vitals shown include unvalidated device data.    Electronically Signed By: Dalila Bernardo  October 5, 2022  12:37 PM

## 2022-10-05 NOTE — PROGRESS NOTES
Patient brought a picture of home covid antigen test on phone as directed.  I personally saw this result and it was time stamped 10/3/22.  Result was Negative.

## 2022-10-06 ENCOUNTER — TELEPHONE (OUTPATIENT)
Dept: SURGERY | Facility: CLINIC | Age: 47
End: 2022-10-06

## 2022-10-06 NOTE — TELEPHONE ENCOUNTER
Breast Nurse Care Coordination post op call placed to patient today.    Selma is s/p left breast lumpectomy on 10/6/2022. Pathology results are still pending. I left a message asking Selma to give me a call back with any questions or concerns.     Anjelica Fry 10/6/2022 10:19 AM      Anjelica Fry RN BSN  Breast Care Nurse Coordinator  [facility] [place]  [phone]

## 2022-10-11 ENCOUNTER — MYC MEDICAL ADVICE (OUTPATIENT)
Dept: SURGERY | Facility: CLINIC | Age: 47
End: 2022-10-11

## 2022-10-11 LAB
PATH REPORT.COMMENTS IMP SPEC: NORMAL
PATH REPORT.FINAL DX SPEC: NORMAL
PATH REPORT.GROSS SPEC: NORMAL
PATH REPORT.MICROSCOPIC SPEC OTHER STN: NORMAL
PATH REPORT.RELEVANT HX SPEC: NORMAL
PATHOLOGY SYNOPTIC REPORT: NORMAL
PHOTO IMAGE: NORMAL

## 2022-10-11 NOTE — TELEPHONE ENCOUNTER
RFID Tag Localized Left Breast Lumpectomy on 10/2/22.    Message sent to Dr. Ortega with results below.    Venus Sinha, RN BSN  Breast Nurse Care Coordinator  [facility] [place] [place]  [phone]        Selma Alonzo [id number]  F, 1975  [facility]  Surgical Pathology Report (Final result) [id number]  Authorizing Provider: Meme Ortega MD Ordering Provider: Meme Ortega MD  Ordering Location: [facility]  OR  Collected: 10/05/2022 10:01 AM  Pathologist: Rafael Orourke MD Received: 10/05/2022 10:25 AM  .  Specimens  A Breast, Left, LEFT BREAST TAG LOCALIZED LUMPECTOMY  B Breast, Left, LEFT BREAST NEW SUPERIOR MARGIN, INK AT NEW MARGIN  C Breast, Left, LEFT BREAST NEW INFERIOR MARGIN, INK AT NEW MARGIN  D Breast, Left, LEFT BREAST NEW LATERAL MARGIN, INK AT NEW MARGIN  E Breast, Left, LEFT BREAST NEW POSTERIOR MARGIN, INK AT NEW MARGIN  F Breast, Left, LEFT BREAST NEW MEDIAL MARGIN, INK AT NEW MARGIN  .  .  Final Diagnosis  A. Breast, left, lumpectomy-  Ductal carcinoma, high nuclear grade with focal comedonecrosis and associated microinvasive carcinoma, margins free of  malignancy (see description and comment)  B-F. Breast, left, reexcision of superior, inferior, lateral,, posterior, medial margins-  Benign breast tissue, no evidence of malignancy  Electronically signed by Rafael Orourke MD on 10/11/2022 at 12:59 PM

## 2022-10-11 NOTE — TELEPHONE ENCOUNTER
I called Selma and discussed her pathology results. It revealed microinvasive carcinoma (<1mm) within an area of high grade DCIS (3.4mm). all final margins are negative.     I would like to present her at our next breast conference to discuss role of return to OR for SLNB vs avoiding this in the setting of such a small area of microinvasion and low risk based on clinical exam and imaging. She is seeing me back in 2 weeks for post op visit to discuss further. Plan for mammogram in 6 months and bilateral mammo in 1 year.     Meme Ortega MD  [facility]  [phone]

## 2022-10-17 ENCOUNTER — TRANSFERRED RECORDS (OUTPATIENT)
Dept: HEALTH INFORMATION MANAGEMENT | Facility: CLINIC | Age: 47
End: 2022-10-17

## 2022-10-25 ENCOUNTER — OFFICE VISIT (OUTPATIENT)
Dept: SURGERY | Facility: CLINIC | Age: 47
End: 2022-10-25
Payer: COMMERCIAL

## 2022-10-25 DIAGNOSIS — D05.12 DUCTAL CARCINOMA IN SITU (DCIS) OF LEFT BREAST: ICD-10-CM

## 2022-10-25 DIAGNOSIS — Z12.31 VISIT FOR SCREENING MAMMOGRAM: ICD-10-CM

## 2022-10-25 DIAGNOSIS — Z09 SURGERY FOLLOW-UP EXAMINATION: Primary | ICD-10-CM

## 2022-10-25 PROCEDURE — 99024 POSTOP FOLLOW-UP VISIT: CPT | Performed by: SURGERY

## 2022-10-25 NOTE — PROGRESS NOTES
[facility] Breast Surgery Postoperative Note    S: Selma is healing well. Mild pain on her breast is improving. She is a bit frustrated with scheduling with rad onc in [place] and with scheduling with Dr. Espinal (saw an NP recently and was recommended to start tamoxifen though she thought she should start this after completion of radiation.     Breasts: left periareolar incision is healing well.     Pathology: reviewed and copy given    A/P  Selma Alonzo is recovering from a left breast tag localized partial mastectomy on 10/5/2022 for a microinvasive carcinoma (<1mm) within an area of high grade DCIS (3.4mm). all final margins are negative. She is healing well. I discussed her care with Dr. Espinal who agrees with proceeding with adjuvant radiation and hormone blockade. I did discuss plan of care also with Dr. Patricia Gotti (radiation oncology at [place]) who agrees with this plan. Plan for bilateral mammogram in June 2023 with follow up with me after imaging. She is interested in meeting with oncology in [place] as well.     Thank you for the opportunity to help in her care.    Meme Ortega MD  [facility]  [phone]    Please route or send letter to:  Primary Care Provider (PCP) and Referring Provider

## 2022-10-26 ENCOUNTER — PATIENT OUTREACH (OUTPATIENT)
Dept: ONCOLOGY | Facility: CLINIC | Age: 47
End: 2022-10-26

## 2022-10-26 NOTE — PROGRESS NOTES
New Patient Oncology Nurse Navigator Note     Referring provider: Dr. Meme Ortega     Referring Clinic/Organization: [facility]     Referred to (specialty:) Medical Oncology     Requested provider (if applicable): Dr. Kelvin Bansal     Date Referral Received: October 26, 2022     Evaluation for:  Breast cancer     Clinical History (per Nurse review of records provided):      Patient had a screening mammogram on 6/29/22 and     Diagnostic imaging followed on 7/21/22 and new pleomorphic calcifications were identified in a fine linear branching pattern in the upper outer left breast.    7/26/22 - Case: [id number] [facility]  A. Breast Left, 2:00 3 cm FN stereotatic Biopsy, needle core biopsy:  Ductal carcinoma in situ (DCIS), grade 3, comedo type with necrosis and calcifications  Estrogen Receptor:         Positive           51-60%   Moderate       Patient met with Dr. Littlejohn in [place] and then sought second opinion with Dr. Ortega on 9/6/22.  She ultimately went with Dr. Ortega and left lumpectomy was performed on 10/5/22.    10/5/22 -  A. Breast, left, lumpectomy-  Ductal carcinoma, high nuclear grade with focal comedonecrosis and associated microinvasive carcinoma, margins free of  malignancy (see description and comment)  B-F. Breast, left, reexcision of superior, inferior, lateral,, posterior, medial margins-  Benign breast tissue, no evidence of malignancy    Records Location: Care Everywhere, Media and See Bookmarked material     Records Needed:   Consult and progress notes for medical oncology -Saran Espinal ([facility].  Consult and progress notes for radiation oncology from Patricia Gotti ([facility] radiation oncology at [place])      Writer received referral, reviewed for appropriate plan, and sent to New Patient Scheduling for completion.

## 2022-10-31 ENCOUNTER — TRANSFERRED RECORDS (OUTPATIENT)
Dept: HEALTH INFORMATION MANAGEMENT | Facility: CLINIC | Age: 47
End: 2022-10-31

## 2022-12-01 ENCOUNTER — TRANSFERRED RECORDS (OUTPATIENT)
Dept: HEALTH INFORMATION MANAGEMENT | Facility: CLINIC | Age: 47
End: 2022-12-01

## 2022-12-06 NOTE — TELEPHONE ENCOUNTER
RECORDS STATUS - BREAST    Action    Action Taken 12/6/22  Spoke w/ Shira @ [place] [facility] - [facility] is CC'd. Shira will fax over records shortly. Shira advised all imaging done @ [facility] (previously resolved) & [place]/[facility].    Spoke w/ DEE Beltre - pt undergoing tx, cannot send tx list yet. They will fax consult note shortly.   7:44 AM    Records from [facility] received, sent to [facility] for STAT upload    Received a call from [facility] Pathology - slides are @ [facility]    Records from [facility] received, sent to [employer] for STAT upload.  11:48 AM       RECORDS REQUESTED FROM: [facility], [facility], [facility], [facility]   DATE REQUESTED: 12/6   NOTES DETAILS STATUS   OFFICE NOTE from referring provider Epic Dr. Meme Ortega   OFFICE NOTE from medical oncologist Received 12/6 Dr. Saran Espinal/[facility]   OFFICE NOTE from surgeon Mike Ortega: 10/25/22   OFFICE NOTE from radiation oncologist Received 12/6 [facility]   DISCHARGE SUMMARY from hospital [facility] 10/5/22   OPERATIVE REPORT Epic 10/5/22: Lumpectomy   MEDICATION LIST [facility]    LABS     PATHOLOGY REPORTS  (Tissue diagnosis, Stage, ER/KY percentage positive and intensity of staining, HER2 IHC, FISH, and all biopsies from breast and any distant metastasis)                 [facility], Report in [place], slides @ [facility]  10/5/22: Surg Path    [facility]  7/26/22: [id number]   IMAGING (NEED IMAGES & REPORT)     MRI PACS 8/1/22: [facility]   MAMMO PACS 10/5/22, 9/30/22: Epic    7/26/22, 7/21/22: [facility]   ULTRASOUND PACS 9/30/22: [facility]

## 2022-12-07 ENCOUNTER — ONCOLOGY VISIT (OUTPATIENT)
Dept: ONCOLOGY | Facility: CLINIC | Age: 47
End: 2022-12-07
Attending: SURGERY
Payer: COMMERCIAL

## 2022-12-07 ENCOUNTER — PRE VISIT (OUTPATIENT)
Dept: ONCOLOGY | Facility: CLINIC | Age: 47
End: 2022-12-07

## 2022-12-07 VITALS
DIASTOLIC BLOOD PRESSURE: 92 MMHG | SYSTOLIC BLOOD PRESSURE: 136 MMHG | WEIGHT: 158.4 LBS | BODY MASS INDEX: 27.04 KG/M2 | RESPIRATION RATE: 16 BRPM | HEIGHT: 64 IN | OXYGEN SATURATION: 97 % | HEART RATE: 99 BPM

## 2022-12-07 DIAGNOSIS — D05.12 DUCTAL CARCINOMA IN SITU (DCIS) OF LEFT BREAST: ICD-10-CM

## 2022-12-07 PROCEDURE — G0463 HOSPITAL OUTPT CLINIC VISIT: HCPCS | Performed by: INTERNAL MEDICINE

## 2022-12-07 PROCEDURE — 99205 OFFICE O/P NEW HI 60 MIN: CPT | Performed by: INTERNAL MEDICINE

## 2022-12-07 RX ORDER — TAMOXIFEN CITRATE 20 MG/1
10 TABLET ORAL 2 TIMES DAILY
Qty: 180 TABLET | Refills: 3 | Status: ON HOLD | OUTPATIENT
Start: 2022-12-07 | End: 2023-08-03

## 2022-12-07 ASSESSMENT — PAIN SCALES - GENERAL: PAINLEVEL: MILD PAIN (3)

## 2022-12-07 NOTE — LETTER
"    12/7/2022         RE: Selma Alonzo  [address] [place] [address]        Dear Colleague,    Thank you for referring your patient, Selma Alonzo, to the [facility]. Please see a copy of my visit note below.    Oncology Rooming Note    December 7, 2022 1:54 PM   Selma Alonzo is a 47 year old female who presents for:    Chief Complaint   Patient presents with     Oncology Clinic Visit     Initial Vitals: There were no vitals taken for this visit. Estimated body mass index is 25.23 kg/m  as calculated from the following:    Height as of 10/5/22: 1.626 m (5' 4\").    Weight as of 10/5/22: 66.7 kg (147 lb). There is no height or weight on file to calculate BSA.  Data Unavailable Comment: Data Unavailable   No LMP recorded.  Allergies reviewed: Yes  Medications reviewed: Yes    Medications: Medication refills not needed today.  Pharmacy name entered into EPIC: Data Unavailable    Clinical concerns:  doctor was notified.      Susi Pelletier [facility]    Hematology/Oncology New Patient Note      Today's Date: 12/12/22    Reason for Consult: breast cancer      HISTORY OF PRESENT ILLNESS:     Oncology treamtent summary:  1.  Screening mammogram June 29, 2022 showed suspicious calcifications in the left breast  2.  Additional mammographic views of the left breast in July 2022 continue to show suspicious calcifications in the upper outer left breast for which biopsy was recommended  3.  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  Estrogen receptor positive  4.  Bilateral breast MRI August 2022 showed postbiopsy changes in the left breast at 2 o'clock position middle depth associated with a biopsy clip marker.  No suspicious areas of enhancement in the remainder of the left breast or the right breast  5.  10/5/2022 final pathology showed DCIS nuclear grade high with " focal comedonecrosis and associated microinvasive carcinoma margins free of malignancy.  Estimated size of DCIS 3.4 mm invasive carcinoma less than 1 mm  6.  Status post radiation therapy completed 11/30/2022    Selma is here to establish care.  She was seen at [place] [facility] and it was recommended that she start tamoxifen although has not been started yet.  She feels well.  She does not have a baseline bone density scan.    REVIEW OF SYSTEMS:   14 point ROS was reviewed and is negative other than as noted above in HPI.       HOME MEDICATIONS:  Current Outpatient Medications   Medication Sig Dispense Refill     betamethasone dipropionate (DIPROSONE) 0.05 % external cream Apply topically 2 times daily       EPINEPHrine (EPIPEN JR) 0.15 MG/0.3ML injection 2-pack Inject 0.15 mg into the muscle as needed for anaphylaxis May repeat one time in 5-15 minutes if response to initial dose is inadequate.       tamoxifen (NOLVADEX) 20 MG tablet Take 0.5 tablets (10 mg) by mouth 2 times daily 180 tablet 3     HYDROcodone-acetaminophen (NORCO) 5-325 MG tablet Take 1-2 tablets by mouth every 4 hours as needed for moderate to severe pain (Patient not taking: Reported on 12/7/2022) 12 tablet 0     senna-docusate (SENOKOT-S/PERICOLACE) 8.6-50 MG tablet Take 1-2 tablets by mouth 2 times daily as needed for constipation (Patient not taking: Reported on 12/7/2022) 15 tablet 0     Gynecologic history: G3, P3.  Menarche age 14.  First child at age 27.  Premenopausal.  10 years of birth control use no history of hormone replacement therapy or fertility treatment    ALLERGIES:  Allergies   Allergen Reactions     Rosa-Kit Bee Sting      Bees          PAST MEDICAL HISTORY:  Past Medical History:   Diagnosis Date     Breast cancer (H)      Dermatitis          PAST SURGICAL HISTORY:  Past Surgical History:   Procedure Laterality Date     ANKLE SURGERY       BACK SURGERY       BIOPSY, BREAST, WITH RADIOFREQUENCY IDENTIFICATION Left 10/5/2022  "   Procedure: left breast tag localized lumpectomy;  Surgeon: Meme Ortega MD;  Location: [facility] OR     CYSTOSCOPY       EYE SURGERY       HIP SURGERY       MOUTH SURGERY           SOCIAL HISTORY:  Social History     Socioeconomic History     Marital status:      Spouse name: Not on file     Number of children: Not on file     Years of education: Not on file     Highest education level: Not on file   Occupational History     Not on file   Tobacco Use     Smoking status: Never     Smokeless tobacco: Never   Substance and Sexual Activity     Alcohol use: Not on file     Drug use: Never     Sexual activity: Not on file   Other Topics Concern     Not on file   Social History Narrative     Not on file     Social Determinants of Health     Financial Resource Strain: Not on file   Food Insecurity: Not on file   Transportation Needs: Not on file   Physical Activity: Not on file   Stress: Not on file   Social Connections: Not on file   Intimate Partner Violence: Not on file   Housing Stability: Not on file   Works as a       FAMILY HISTORY:  Her father is .  He  at age 81.  History of stroke.  No family history of first-degree relatives with breast cancer.  No family history of ovarian cancer.        PHYSICAL EXAM:  Vital signs:  BP (!) 136/92   Pulse 99   Resp 16   Ht 1.626 m (5' 4\")   Wt 71.8 kg (158 lb 6.4 oz)   SpO2 97%   BMI 27.19 kg/m     ECO  GENERAL/CONSTITUTIONAL: No acute distress.  EYES: Pupils are equal, round, and react to light and accommodation. Extraocular movements intact.  No scleral icterus.  ENT/MOUTH: Neck supple. Oropharynx clear, no mucositis.  LYMPH: No anterior cervical, posterior cervical, supraclavicular, axillary or inguinal adenopathy.   RESPIRATORY: Clear to auscultation bilaterally. No crackles or wheezing.   CARDIOVASCULAR: Regular rate and rhythm without murmurs, gallops, or rubs.  GASTROINTESTINAL: No hepatosplenomegaly, masses, or " tenderness. The patient has normal bowel sounds. No guarding.  No distention.  MUSCULOSKELETAL: Warm and well-perfused, no cyanosis, clubbing, or edema.  NEUROLOGIC: Cranial nerves II-XII are intact. Alert, oriented, answers questions appropriately.  INTEGUMENTARY: No rashes or jaundice.  GAIT: Steady, does not use assistive device  Breast: Status post left breast lumpectomy healed very well.  Right breast within normal limits no abnormalities noted    LABS:  No labs    PATHOLOGY:  As per HPI    IMAGING:  None    ASSESSMENT/PLAN:  Selma is a very pleasant 47-year-old premenopausal female who has a diagnosis of grade 3 DCIS status postlumpectomy and radiation left breast    I discussed with the patient that I would recommend tamoxifen splitting the dose in half to 10 mg twice daily initially she should start 10 mg a day for 2 to 3 weeks followed by 10 mg twice a day.  Side effects explained written information given.  She would like to proceed.  Not only way to decrease her risk of breast cancer recurrence in the same breast but also the opposite breast by 50%.  I would recommend alternating MRIs with mammograms as she has very heterogeneously dense breast.  We can get abbreviated MRIs.      1.  Breast DCIS: Start tamoxifen 10 mg twice daily.  See me back in 2 months to assess tolerability.  Aspirin 81 mg a day for DVT prevention    2.  Screening would recommend MRIs alternating with mammograms.  She finished radiation in November 2022 her first mammogram on the left side will be in summer July  2023 then followed by bilateral mammogram in July 2023    See me in 3 months to assess tolerability      Kelvin Bansal MD  Hematology/Oncology  [facility]      Again, thank you for allowing me to participate in the care of your patient.        Sincerely,        Kelvin Bansal MD

## 2022-12-07 NOTE — PROGRESS NOTES
"Oncology Rooming Note    December 7, 2022 1:54 PM   Selma Alonzo is a 47 year old female who presents for:    Chief Complaint   Patient presents with     Oncology Clinic Visit     Initial Vitals: There were no vitals taken for this visit. Estimated body mass index is 25.23 kg/m  as calculated from the following:    Height as of 10/5/22: 1.626 m (5' 4\").    Weight as of 10/5/22: 66.7 kg (147 lb). There is no height or weight on file to calculate BSA.  Data Unavailable Comment: Data Unavailable   No LMP recorded.  Allergies reviewed: Yes  Medications reviewed: Yes    Medications: Medication refills not needed today.  Pharmacy name entered into EPIC: Data Unavailable    Clinical concerns:  doctor was notified.      Susi Pelletier CMA            "

## 2022-12-12 NOTE — PROGRESS NOTES
[facility]    Hematology/Oncology New Patient Note      Today's Date: 12/12/22    Reason for Consult: breast cancer      HISTORY OF PRESENT ILLNESS:     Oncology treamtent summary:  1.  Screening mammogram June 29, 2022 showed suspicious calcifications in the left breast  2.  Additional mammographic views of the left breast in July 2022 continue to show suspicious calcifications in the upper outer left breast for which biopsy was recommended  3.  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  Estrogen receptor positive  4.  Bilateral breast MRI August 2022 showed postbiopsy changes in the left breast at 2 o'clock position middle depth associated with a biopsy clip marker.  No suspicious areas of enhancement in the remainder of the left breast or the right breast  5.  10/5/2022 final pathology showed DCIS nuclear grade high with focal comedonecrosis and associated microinvasive carcinoma margins free of malignancy.  Estimated size of DCIS 3.4 mm invasive carcinoma less than 1 mm  6.  Status post radiation therapy completed 11/30/2022    Selma is here to establish care.  She was seen at [place] [facility] and it was recommended that she start tamoxifen although has not been started yet.  She feels well.  She does not have a baseline bone density scan.    REVIEW OF SYSTEMS:   14 point ROS was reviewed and is negative other than as noted above in HPI.       HOME MEDICATIONS:  Current Outpatient Medications   Medication Sig Dispense Refill     betamethasone dipropionate (DIPROSONE) 0.05 % external cream Apply topically 2 times daily       EPINEPHrine (EPIPEN JR) 0.15 MG/0.3ML injection 2-pack Inject 0.15 mg into the muscle as needed for anaphylaxis May repeat one time in 5-15 minutes if response to initial dose is inadequate.       tamoxifen (NOLVADEX) 20 MG tablet Take 0.5 tablets (10 mg) by mouth 2 times daily 180 tablet 3      HYDROcodone-acetaminophen (NORCO) 5-325 MG tablet Take 1-2 tablets by mouth every 4 hours as needed for moderate to severe pain (Patient not taking: Reported on 12/7/2022) 12 tablet 0     senna-docusate (SENOKOT-S/PERICOLACE) 8.6-50 MG tablet Take 1-2 tablets by mouth 2 times daily as needed for constipation (Patient not taking: Reported on 12/7/2022) 15 tablet 0     Gynecologic history: G3, P3.  Menarche age 14.  First child at age 27.  Premenopausal.  10 years of birth control use no history of hormone replacement therapy or fertility treatment    ALLERGIES:  Allergies   Allergen Reactions     Rosa-Kit Bee Sting      Bees          PAST MEDICAL HISTORY:  Past Medical History:   Diagnosis Date     Breast cancer (H)      Dermatitis          PAST SURGICAL HISTORY:  Past Surgical History:   Procedure Laterality Date     ANKLE SURGERY       BACK SURGERY       BIOPSY, BREAST, WITH RADIOFREQUENCY IDENTIFICATION Left 10/5/2022    Procedure: left breast tag localized lumpectomy;  Surgeon: Meme Ortega MD;  Location: [facility] OR     CYSTOSCOPY       EYE SURGERY       HIP SURGERY       MOUTH SURGERY           SOCIAL HISTORY:  Social History     Socioeconomic History     Marital status:      Spouse name: Not on file     Number of children: Not on file     Years of education: Not on file     Highest education level: Not on file   Occupational History     Not on file   Tobacco Use     Smoking status: Never     Smokeless tobacco: Never   Substance and Sexual Activity     Alcohol use: Not on file     Drug use: Never     Sexual activity: Not on file   Other Topics Concern     Not on file   Social History Narrative     Not on file     Social Determinants of Health     Financial Resource Strain: Not on file   Food Insecurity: Not on file   Transportation Needs: Not on file   Physical Activity: Not on file   Stress: Not on file   Social Connections: Not on file   Intimate Partner Violence: Not on file   Housing Stability: Not  "on file   Works as a       FAMILY HISTORY:  Her father is .  He  at age 81.  History of stroke.  No family history of first-degree relatives with breast cancer.  No family history of ovarian cancer.        PHYSICAL EXAM:  Vital signs:  BP (!) 136/92   Pulse 99   Resp 16   Ht 1.626 m (5' 4\")   Wt 71.8 kg (158 lb 6.4 oz)   SpO2 97%   BMI 27.19 kg/m     ECO  GENERAL/CONSTITUTIONAL: No acute distress.  EYES: Pupils are equal, round, and react to light and accommodation. Extraocular movements intact.  No scleral icterus.  ENT/MOUTH: Neck supple. Oropharynx clear, no mucositis.  LYMPH: No anterior cervical, posterior cervical, supraclavicular, axillary or inguinal adenopathy.   RESPIRATORY: Clear to auscultation bilaterally. No crackles or wheezing.   CARDIOVASCULAR: Regular rate and rhythm without murmurs, gallops, or rubs.  GASTROINTESTINAL: No hepatosplenomegaly, masses, or tenderness. The patient has normal bowel sounds. No guarding.  No distention.  MUSCULOSKELETAL: Warm and well-perfused, no cyanosis, clubbing, or edema.  NEUROLOGIC: Cranial nerves II-XII are intact. Alert, oriented, answers questions appropriately.  INTEGUMENTARY: No rashes or jaundice.  GAIT: Steady, does not use assistive device  Breast: Status post left breast lumpectomy healed very well.  Right breast within normal limits no abnormalities noted    LABS:  No labs    PATHOLOGY:  As per HPI    IMAGING:  None    ASSESSMENT/PLAN:  Selma is a very pleasant 47-year-old premenopausal female who has a diagnosis of grade 3 DCIS status postlumpectomy and radiation left breast    I discussed with the patient that I would recommend tamoxifen splitting the dose in half to 10 mg twice daily initially she should start 10 mg a day for 2 to 3 weeks followed by 10 mg twice a day.  Side effects explained written information given.  She would like to proceed.  Not only way to decrease her risk of breast cancer " recurrence in the same breast but also the opposite breast by 50%.  I would recommend alternating MRIs with mammograms as she has very heterogeneously dense breast.  We can get abbreviated MRIs.      1.  Breast DCIS: Start tamoxifen 10 mg twice daily.  See me back in 2 months to assess tolerability.  Aspirin 81 mg a day for DVT prevention    2.  Screening would recommend MRIs alternating with mammograms.  She finished radiation in November 2022 her first mammogram on the left side will be in summer July  2023 then followed by bilateral mammogram in July 2023    See me in 3 months to assess tolerability      Kelvin Bansal MD  Hematology/Oncology  [facility]

## 2022-12-23 ENCOUNTER — TRANSFERRED RECORDS (OUTPATIENT)
Dept: HEALTH INFORMATION MANAGEMENT | Facility: CLINIC | Age: 47
End: 2022-12-23

## 2023-01-14 ENCOUNTER — HEALTH MAINTENANCE LETTER (OUTPATIENT)
Age: 48
End: 2023-01-14

## 2023-03-10 ENCOUNTER — ONCOLOGY VISIT (OUTPATIENT)
Dept: ONCOLOGY | Facility: CLINIC | Age: 48
End: 2023-03-10
Attending: INTERNAL MEDICINE
Payer: COMMERCIAL

## 2023-03-10 VITALS
BODY MASS INDEX: 26.91 KG/M2 | OXYGEN SATURATION: 98 % | HEIGHT: 64 IN | HEART RATE: 103 BPM | WEIGHT: 157.6 LBS | RESPIRATION RATE: 16 BRPM | DIASTOLIC BLOOD PRESSURE: 88 MMHG | SYSTOLIC BLOOD PRESSURE: 118 MMHG

## 2023-03-10 DIAGNOSIS — C50.011 MALIGNANT NEOPLASM OF NIPPLE OF RIGHT BREAST IN FEMALE, UNSPECIFIED ESTROGEN RECEPTOR STATUS (H): Primary | ICD-10-CM

## 2023-03-10 LAB
ALBUMIN SERPL BCG-MCNC: 4.2 G/DL (ref 3.5–5.2)
ALP SERPL-CCNC: 50 U/L (ref 35–104)
ALT SERPL W P-5'-P-CCNC: 53 U/L (ref 10–35)
ANION GAP SERPL CALCULATED.3IONS-SCNC: 8 MMOL/L (ref 7–15)
AST SERPL W P-5'-P-CCNC: 44 U/L (ref 10–35)
BILIRUB SERPL-MCNC: 0.3 MG/DL
BUN SERPL-MCNC: 13.1 MG/DL (ref 6–20)
CALCIUM SERPL-MCNC: 10.1 MG/DL (ref 8.6–10)
CHLORIDE SERPL-SCNC: 102 MMOL/L (ref 98–107)
CREAT SERPL-MCNC: 0.72 MG/DL (ref 0.51–0.95)
DEPRECATED CALCIDIOL+CALCIFEROL SERPL-MC: 52 UG/L (ref 20–75)
DEPRECATED HCO3 PLAS-SCNC: 30 MMOL/L (ref 22–29)
GFR SERPL CREATININE-BSD FRML MDRD: >90 ML/MIN/1.73M2
GLUCOSE SERPL-MCNC: 105 MG/DL (ref 70–99)
POTASSIUM SERPL-SCNC: 4.8 MMOL/L (ref 3.4–5.3)
PROT SERPL-MCNC: 7.2 G/DL (ref 6.4–8.3)
SODIUM SERPL-SCNC: 140 MMOL/L (ref 136–145)

## 2023-03-10 PROCEDURE — G0463 HOSPITAL OUTPT CLINIC VISIT: HCPCS | Performed by: INTERNAL MEDICINE

## 2023-03-10 PROCEDURE — 80053 COMPREHEN METABOLIC PANEL: CPT | Performed by: INTERNAL MEDICINE

## 2023-03-10 PROCEDURE — 99214 OFFICE O/P EST MOD 30 MIN: CPT | Performed by: INTERNAL MEDICINE

## 2023-03-10 PROCEDURE — 82306 VITAMIN D 25 HYDROXY: CPT | Performed by: INTERNAL MEDICINE

## 2023-03-10 PROCEDURE — 36415 COLL VENOUS BLD VENIPUNCTURE: CPT | Performed by: INTERNAL MEDICINE

## 2023-03-10 RX ORDER — TAMOXIFEN CITRATE 20 MG/1
10 TABLET ORAL DAILY
Qty: 90 TABLET | Refills: 1 | Status: SHIPPED | OUTPATIENT
Start: 2023-03-10 | End: 2023-08-02

## 2023-03-10 ASSESSMENT — PAIN SCALES - GENERAL: PAINLEVEL: MILD PAIN (3)

## 2023-03-10 NOTE — PROGRESS NOTES
"Oncology Rooming Note    March 10, 2023 11:39 AM   Selma Alonzo is a 47 year old female who presents for:    Chief Complaint   Patient presents with     Oncology Clinic Visit     Initial Vitals: /88   Pulse 103   Resp 16   Ht 1.626 m (5' 4\")   Wt 71.5 kg (157 lb 9.6 oz)   SpO2 98%   BMI 27.05 kg/m   Estimated body mass index is 27.05 kg/m  as calculated from the following:    Height as of this encounter: 1.626 m (5' 4\").    Weight as of this encounter: 71.5 kg (157 lb 9.6 oz). Body surface area is 1.8 meters squared.  Mild Pain (3) Comment: Data Unavailable   No LMP recorded.  Allergies reviewed: Yes  Medications reviewed: Yes    Medications: Refills may be needed on the Tamoxifen.  Pharmacy name entered into [employer]: [id number] [place] [address]    Clinical concerns: Selma is wondering if a prescription gave be written for Tamoxifen in 10 MG tablets rather then 20 MG since she splits them day and night.       Maricarmen Nuñez MA              "

## 2023-03-10 NOTE — PROGRESS NOTES
[facility]    Hematology/Oncology New Patient Note      Today's Date: 03/10/2023   Reason for Consult: breast cancer      HISTORY OF PRESENT ILLNESS:     Oncology treamtent summary:  1.  Screening mammogram June 29, 2022 showed suspicious calcifications in the left breast  2.  Additional mammographic views of the left breast in July 2022 continue to show suspicious calcifications in the upper outer left breast for which biopsy was recommended  3.  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  Estrogen receptor positive  4.  Bilateral breast MRI August 2022 showed postbiopsy changes in the left breast at 2 o'clock position middle depth associated with a biopsy clip marker.  No suspicious areas of enhancement in the remainder of the left breast or the right breast  5.  10/5/2022 final pathology showed DCIS nuclear grade high with focal comedonecrosis and associated microinvasive carcinoma margins free of malignancy.  Estimated size of DCIS 3.4 mm invasive carcinoma less than 1 mm  6.  Status post radiation therapy completed 11/30/2022    Selma is here to establish care.  She was seen at [place] [facility] and it was recommended that she start tamoxifen although has not been started yet.  She feels well.  She does not have a baseline bone density scan.    REVIEW OF SYSTEMS:   14 point ROS was reviewed and is negative other than as noted above in HPI.       HOME MEDICATIONS:  Current Outpatient Medications   Medication Sig Dispense Refill     betamethasone dipropionate (DIPROSONE) 0.05 % external cream Apply topically 2 times daily       EPINEPHrine (EPIPEN JR) 0.15 MG/0.3ML injection 2-pack Inject 0.15 mg into the muscle as needed for anaphylaxis May repeat one time in 5-15 minutes if response to initial dose is inadequate.       HYDROcodone-acetaminophen (NORCO) 5-325 MG tablet Take 1-2 tablets by mouth every 4 hours as  needed for moderate to severe pain 12 tablet 0     senna-docusate (SENOKOT-S/PERICOLACE) 8.6-50 MG tablet Take 1-2 tablets by mouth 2 times daily as needed for constipation 15 tablet 0     tamoxifen (NOLVADEX) 20 MG tablet Take 0.5 tablets (10 mg) by mouth 2 times daily 180 tablet 3     Gynecologic history: G3, P3.  Menarche age 14.  First child at age 27.  Premenopausal.  10 years of birth control use no history of hormone replacement therapy or fertility treatment    ALLERGIES:  Allergies   Allergen Reactions     Rosa-Kit Bee Sting      Bees          PAST MEDICAL HISTORY:  Past Medical History:   Diagnosis Date     Breast cancer (H)      Dermatitis          PAST SURGICAL HISTORY:  Past Surgical History:   Procedure Laterality Date     ANKLE SURGERY       BACK SURGERY       BIOPSY, BREAST, WITH RADIOFREQUENCY IDENTIFICATION Left 10/5/2022    Procedure: left breast tag localized lumpectomy;  Surgeon: Meme Ortega MD;  Location: [facility] OR     CYSTOSCOPY       EYE SURGERY       HIP SURGERY       MOUTH SURGERY           SOCIAL HISTORY:  Social History     Socioeconomic History     Marital status:      Spouse name: Not on file     Number of children: Not on file     Years of education: Not on file     Highest education level: Not on file   Occupational History     Not on file   Tobacco Use     Smoking status: Never     Smokeless tobacco: Never   Substance and Sexual Activity     Alcohol use: Not on file     Drug use: Never     Sexual activity: Not on file   Other Topics Concern     Not on file   Social History Narrative     Not on file     Social Determinants of Health     Financial Resource Strain: Not on file   Food Insecurity: Not on file   Transportation Needs: Not on file   Physical Activity: Not on file   Stress: Not on file   Social Connections: Not on file   Intimate Partner Violence: Not on file   Housing Stability: Not on file   Works as a       FAMILY HISTORY:  Her father is  ".  He  at age 81.  History of stroke.  No family history of first-degree relatives with breast cancer.  No family history of ovarian cancer.        PHYSICAL EXAM:  Vital signs:  /88   Pulse 103   Resp 16   Ht 1.626 m (5' 4\")   Wt 71.5 kg (157 lb 9.6 oz)   SpO2 98%   BMI 27.05 kg/m     ECO  GENERAL/CONSTITUTIONAL: No acute distress.  EYES: Pupils are equal, round, and react to light and accommodation. Extraocular movements intact.  No scleral icterus.  ENT/MOUTH: Neck supple. Oropharynx clear, no mucositis.  LYMPH: No anterior cervical, posterior cervical, supraclavicular, axillary or inguinal adenopathy.   RESPIRATORY: Clear to auscultation bilaterally. No crackles or wheezing.   CARDIOVASCULAR: Regular rate and rhythm without murmurs, gallops, or rubs.  GASTROINTESTINAL: No hepatosplenomegaly, masses, or tenderness. The patient has normal bowel sounds. No guarding.  No distention.  MUSCULOSKELETAL: Warm and well-perfused, no cyanosis, clubbing, or edema.  NEUROLOGIC: Cranial nerves II-XII are intact. Alert, oriented, answers questions appropriately.  INTEGUMENTARY: No rashes or jaundice.  GAIT: Steady, does not use assistive device  Breast: Status post left breast lumpectomy healed very well.  Right breast within normal limits no abnormalities noted    LABS:  No labs    PATHOLOGY:  As per HPI    IMAGING:  None    ASSESSMENT/PLAN:  Selma is a very pleasant 47-year-old premenopausal female who has a diagnosis of grade 3 DCIS status postlumpectomy and radiation left breast    I discussed with the patient that I would recommend tamoxifen splitting the dose in half to 10 mg twice daily initially she should start 10 mg a day for 2 to 3 weeks followed by 10 mg twice a day.  Side effects explained written information given.  She would like to proceed.  Not only way to decrease her risk of breast cancer recurrence in the same breast but also the opposite breast by 50%.  I would recommend " alternating MRIs with mammograms as she has very heterogeneously dense breast.  We can get abbreviated MRIs.      1.  Breast DCIS: Start tamoxifen 10 mg twice daily.  See me back in 2 months to assess tolerability.  Aspirin 81 mg a day for DVT prevention    2.  Screening would recommend MRIs alternating with mammograms.  She finished radiation in November 2022 her first mammogram on the left side will be in summer July 2023 then followed by bilateral mammogram in July 2023    See me in 3 months to assess tolerability      Kelvin Bansal MD  Hematology/Oncology  [facility]

## 2023-03-10 NOTE — PROGRESS NOTES
[facility]    Hematology/Oncology return patient note      Today's Date: 3/10/2022    Reason for Consult: breast cancer      HISTORY OF PRESENT ILLNESS:     Oncology treamtent summary:  1.  Screening mammogram June 29, 2022 showed suspicious calcifications in the left breast  2.  Additional mammographic views of the left breast in July 2022 continue to show suspicious calcifications in the upper outer left breast for which biopsy was recommended  3.  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  Estrogen receptor positive  4.  Bilateral breast MRI August 2022 showed postbiopsy changes in the left breast at 2 o'clock position middle depth associated with a biopsy clip marker.  No suspicious areas of enhancement in the remainder of the left breast or the right breast  5.  10/5/2022 final pathology showed DCIS nuclear grade high with focal comedonecrosis and associated microinvasive carcinoma margins free of malignancy.  Estimated size of DCIS 3.4 mm invasive carcinoma less than 1 mm  6.  Status post radiation therapy completed 11/30/2022  7 started tamoxifen 20 mg a day December 2022        Selma returns for follow up. She feels well.  She states the tamoxifen 20 mg a day which is causing significant side effects (mood issues) arthralgias. She is having difficult losing weight as well.    REVIEW OF SYSTEMS:   14 point ROS was reviewed and is negative other than as noted above in HPI.       HOME MEDICATIONS:  Current Outpatient Medications   Medication Sig Dispense Refill     betamethasone dipropionate (DIPROSONE) 0.05 % external cream Apply topically 2 times daily       EPINEPHrine (EPIPEN JR) 0.15 MG/0.3ML injection 2-pack Inject 0.15 mg into the muscle as needed for anaphylaxis May repeat one time in 5-15 minutes if response to initial dose is inadequate.       HYDROcodone-acetaminophen (NORCO) 5-325 MG tablet Take 1-2  tablets by mouth every 4 hours as needed for moderate to severe pain 12 tablet 0     senna-docusate (SENOKOT-S/PERICOLACE) 8.6-50 MG tablet Take 1-2 tablets by mouth 2 times daily as needed for constipation 15 tablet 0     tamoxifen (NOLVADEX) 20 MG tablet Take 0.5 tablets (10 mg) by mouth 2 times daily 180 tablet 3     Gynecologic history: G3, P3.  Menarche age 14.  First child at age 27.  Premenopausal.  10 years of birth control use no history of hormone replacement therapy or fertility treatment    ALLERGIES:  Allergies   Allergen Reactions     Rosa-Kit Bee Sting      Bees          PAST MEDICAL HISTORY:  Past Medical History:   Diagnosis Date     Breast cancer (H)      Dermatitis          PAST SURGICAL HISTORY:  Past Surgical History:   Procedure Laterality Date     ANKLE SURGERY       BACK SURGERY       BIOPSY, BREAST, WITH RADIOFREQUENCY IDENTIFICATION Left 10/5/2022    Procedure: left breast tag localized lumpectomy;  Surgeon: Meme Ortega MD;  Location: [facility] OR     CYSTOSCOPY       EYE SURGERY       HIP SURGERY       MOUTH SURGERY           SOCIAL HISTORY:  Social History     Socioeconomic History     Marital status:      Spouse name: Not on file     Number of children: Not on file     Years of education: Not on file     Highest education level: Not on file   Occupational History     Not on file   Tobacco Use     Smoking status: Never     Smokeless tobacco: Never   Substance and Sexual Activity     Alcohol use: Not on file     Drug use: Never     Sexual activity: Not on file   Other Topics Concern     Not on file   Social History Narrative     Not on file     Social Determinants of Health     Financial Resource Strain: Not on file   Food Insecurity: Not on file   Transportation Needs: Not on file   Physical Activity: Not on file   Stress: Not on file   Social Connections: Not on file   Intimate Partner Violence: Not on file   Housing Stability: Not on file   Works as a special education  "teacher      FAMILY HISTORY:  Her father is .  He  at age 81.  History of stroke.  No family history of first-degree relatives with breast cancer.  No family history of ovarian cancer.        PHYSICAL EXAM:  Vital signs:  /88   Pulse 103   Resp 16   Ht 1.626 m (5' 4\")   Wt 71.5 kg (157 lb 9.6 oz)   SpO2 98%   BMI 27.05 kg/m     ECO  GENERAL/CONSTITUTIONAL: No acute distress.  EYES: Pupils are equal, round, and react to light and accommodation. Extraocular movements intact.  No scleral icterus.  ENT/MOUTH: Neck supple. Oropharynx clear, no mucositis.  LYMPH: No anterior cervical, posterior cervical, supraclavicular, axillary or inguinal adenopathy.   RESPIRATORY: Clear to auscultation bilaterally. No crackles or wheezing.   CARDIOVASCULAR: Regular rate and rhythm without murmurs, gallops, or rubs.  GASTROINTESTINAL: No hepatosplenomegaly, masses, or tenderness. The patient has normal bowel sounds. No guarding.  No distention.  MUSCULOSKELETAL: Warm and well-perfused, no cyanosis, clubbing, or edema.  NEUROLOGIC: Cranial nerves II-XII are intact. Alert, oriented, answers questions appropriately.  INTEGUMENTARY: No rashes or jaundice.  GAIT: Steady, does not use assistive device  Breast: Status post lumpectomy no evidence of recurrence left breast with radiation changes.    Right breast normal  LABS:  No labs     PATHOLOGY:  As per HPI    IMAGING:  None    ASSESSMENT/PLAN:  Selma is a very pleasant 47-year-old premenopausal female who has a diagnosis of grade 3 DCIS status postlumpectomy and radiation left breast    On tamoxifen 20 mg a day not tolerating it well. Based on Tam01 study I think its reasonable for her to drop the dose to 10 mg a day if she doesn't tolerate that we can try 5 mg (10 mg every other day). I  Told her since she's not tolerating the high dose well (she does have microinvasion ) I would rather have her try half the dose . Told her that tam01 study is not a 10)% " applicable to her due to the microinvasion. She would like to try the lower dsoe      1.  Breast DCIS: change tamoxifen to 10 mg a day  Mammogram in June ,mri 6 months from then    2.  dexa prior to next visit    3 baseline LFTs today.     See me in 6 months    Total time spent on day of visit, including review of tests, obtaining/reviewing separately obtained history, ordering medications/tests/procedures, communicating with PCP/consultants, and documenting in electronic medical record: 30 minutes    Kelvin Bansal MD  Hematology/Oncology  [facility]

## 2023-03-10 NOTE — LETTER
"    3/10/2023         RE: Selma Alonzo  [address] [facility] [address]        Dear Colleague,    Thank you for referring your patient, Selma Alonzo, to the [facility]. Please see a copy of my visit note below.    Oncology Rooming Note    March 10, 2023 11:39 AM   Selma Alonzo is a 47 year old female who presents for:    Chief Complaint   Patient presents with     Oncology Clinic Visit     Initial Vitals: /88   Pulse 103   Resp 16   Ht 1.626 m (5' 4\")   Wt 71.5 kg (157 lb 9.6 oz)   SpO2 98%   BMI 27.05 kg/m   Estimated body mass index is 27.05 kg/m  as calculated from the following:    Height as of this encounter: 1.626 m (5' 4\").    Weight as of this encounter: 71.5 kg (157 lb 9.6 oz). Body surface area is 1.8 meters squared.  Mild Pain (3) Comment: Data Unavailable   No LMP recorded.  Allergies reviewed: Yes  Medications reviewed: Yes    Medications: Refills may be needed on the Tamoxifen.  Pharmacy name entered into [facility]: [id number] [place] [address]    Clinical concerns: Selma is wondering if a prescription gave be written for Tamoxifen in 10 MG tablets rather then 20 MG since she splits them day and night.       Maricarmen Nuñez MA                [facility]    Hematology/Oncology return patient note      Today's Date: 3/10/2022    Reason for Consult: breast cancer      HISTORY OF PRESENT ILLNESS:     Oncology treamtent summary:  1.  Screening mammogram June 29, 2022 showed suspicious calcifications in the left breast  2.  Additional mammographic views of the left breast in July 2022 continue to show suspicious calcifications in the upper outer left breast for which biopsy was recommended  3.  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  Estrogen receptor positive  4.  Bilateral breast MRI August 2022 showed postbiopsy changes in the left " breast at 2 o'clock position middle depth associated with a biopsy clip marker.  No suspicious areas of enhancement in the remainder of the left breast or the right breast  5.  10/5/2022 final pathology showed DCIS nuclear grade high with focal comedonecrosis and associated microinvasive carcinoma margins free of malignancy.  Estimated size of DCIS 3.4 mm invasive carcinoma less than 1 mm  6.  Status post radiation therapy completed 11/30/2022  7 started tamoxifen 20 mg a day December 2022        Selma returns for follow up. She feels well.  She states the tamoxifen 20 mg a day which is causing significant side effects (mood issues) arthralgias. She is having difficult losing weight as well.    REVIEW OF SYSTEMS:   14 point ROS was reviewed and is negative other than as noted above in HPI.       HOME MEDICATIONS:  Current Outpatient Medications   Medication Sig Dispense Refill     betamethasone dipropionate (DIPROSONE) 0.05 % external cream Apply topically 2 times daily       EPINEPHrine (EPIPEN JR) 0.15 MG/0.3ML injection 2-pack Inject 0.15 mg into the muscle as needed for anaphylaxis May repeat one time in 5-15 minutes if response to initial dose is inadequate.       HYDROcodone-acetaminophen (NORCO) 5-325 MG tablet Take 1-2 tablets by mouth every 4 hours as needed for moderate to severe pain 12 tablet 0     senna-docusate (SENOKOT-S/PERICOLACE) 8.6-50 MG tablet Take 1-2 tablets by mouth 2 times daily as needed for constipation 15 tablet 0     tamoxifen (NOLVADEX) 20 MG tablet Take 0.5 tablets (10 mg) by mouth 2 times daily 180 tablet 3     Gynecologic history: G3, P3.  Menarche age 14.  First child at age 27.  Premenopausal.  10 years of birth control use no history of hormone replacement therapy or fertility treatment    ALLERGIES:  Allergies   Allergen Reactions     Rosa-Kit Bee Sting      Bees          PAST MEDICAL HISTORY:  Past Medical History:   Diagnosis Date     Breast cancer (H)      Dermatitis   "        PAST SURGICAL HISTORY:  Past Surgical History:   Procedure Laterality Date     ANKLE SURGERY       BACK SURGERY       BIOPSY, BREAST, WITH RADIOFREQUENCY IDENTIFICATION Left 10/5/2022    Procedure: left breast tag localized lumpectomy;  Surgeon: Meme Ortega MD;  Location: [facility] OR     CYSTOSCOPY       EYE SURGERY       HIP SURGERY       MOUTH SURGERY           SOCIAL HISTORY:  Social History     Socioeconomic History     Marital status:      Spouse name: Not on file     Number of children: Not on file     Years of education: Not on file     Highest education level: Not on file   Occupational History     Not on file   Tobacco Use     Smoking status: Never     Smokeless tobacco: Never   Substance and Sexual Activity     Alcohol use: Not on file     Drug use: Never     Sexual activity: Not on file   Other Topics Concern     Not on file   Social History Narrative     Not on file     Social Determinants of Health     Financial Resource Strain: Not on file   Food Insecurity: Not on file   Transportation Needs: Not on file   Physical Activity: Not on file   Stress: Not on file   Social Connections: Not on file   Intimate Partner Violence: Not on file   Housing Stability: Not on file   Works as a       FAMILY HISTORY:  Her father is .  He  at age 81.  History of stroke.  No family history of first-degree relatives with breast cancer.  No family history of ovarian cancer.        PHYSICAL EXAM:  Vital signs:  /88   Pulse 103   Resp 16   Ht 1.626 m (5' 4\")   Wt 71.5 kg (157 lb 9.6 oz)   SpO2 98%   BMI 27.05 kg/m     ECO  GENERAL/CONSTITUTIONAL: No acute distress.  EYES: Pupils are equal, round, and react to light and accommodation. Extraocular movements intact.  No scleral icterus.  ENT/MOUTH: Neck supple. Oropharynx clear, no mucositis.  LYMPH: No anterior cervical, posterior cervical, supraclavicular, axillary or inguinal adenopathy.   RESPIRATORY: Clear " to auscultation bilaterally. No crackles or wheezing.   CARDIOVASCULAR: Regular rate and rhythm without murmurs, gallops, or rubs.  GASTROINTESTINAL: No hepatosplenomegaly, masses, or tenderness. The patient has normal bowel sounds. No guarding.  No distention.  MUSCULOSKELETAL: Warm and well-perfused, no cyanosis, clubbing, or edema.  NEUROLOGIC: Cranial nerves II-XII are intact. Alert, oriented, answers questions appropriately.  INTEGUMENTARY: No rashes or jaundice.  GAIT: Steady, does not use assistive device  Breast: Status post lumpectomy no evidence of recurrence left breast with radiation changes.    Right breast normal  LABS:  No labs     PATHOLOGY:  As per HPI    IMAGING:  None    ASSESSMENT/PLAN:  Selma is a very pleasant 47-year-old premenopausal female who has a diagnosis of grade 3 DCIS status postlumpectomy and radiation left breast    On tamoxifen 20 mg a day not tolerating it well. Based on Tam01 study I think its reasonable for her to drop the dose to 10 mg a day if she doesn't tolerate that we can try 5 mg (10 mg every other day). I  Told her since she's not tolerating the high dose well (she does have microinvasion ) I would rather have her try half the dose . Told her that tam01 study is not a 10)% applicable to her due to the microinvasion. She would like to try the lower dsoe      1.  Breast DCIS: change tamoxifen to 10 mg a day  Mammogram in June ,mri 6 months from then    2.  dexa prior to next visit    3 baseline LFTs today.     See me in 6 months    Total time spent on day of visit, including review of tests, obtaining/reviewing separately obtained history, ordering medications/tests/procedures, communicating with PCP/consultants, and documenting in electronic medical record: 30 minutes    Kelvin Bansal MD  Hematology/Oncology  [facility]    [facility]    Hematology/Oncology New Patient Note      Today's Date: 03/10/2023   Reason for Consult:  breast cancer      HISTORY OF PRESENT ILLNESS:     Oncology [facility] summary:  1.  Screening mammogram June 29, 2022 showed suspicious calcifications in the left breast  2.  Additional mammographic views of the left breast in July 2022 continue to show suspicious calcifications in the upper outer left breast for which biopsy was recommended  3.  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  Estrogen receptor positive  4.  Bilateral breast MRI August 2022 showed postbiopsy changes in the left breast at 2 o'clock position middle depth associated with a biopsy clip marker.  No suspicious areas of enhancement in the remainder of the left breast or the right breast  5.  10/5/2022 final pathology showed DCIS nuclear grade high with focal comedonecrosis and associated microinvasive carcinoma margins free of malignancy.  Estimated size of DCIS 3.4 mm invasive carcinoma less than 1 mm  6.  Status post radiation therapy completed 11/30/2022    Selma is here to establish care.  She was seen at [facility] and it was recommended that she start tamoxifen although has not been started yet.  She feels well.  She does not have a baseline bone density scan.    REVIEW OF SYSTEMS:   14 point ROS was reviewed and is negative other than as noted above in HPI.       HOME MEDICATIONS:  Current Outpatient Medications   Medication Sig Dispense Refill     betamethasone dipropionate (DIPROSONE) 0.05 % external cream Apply topically 2 times daily       EPINEPHrine (EPIPEN JR) 0.15 MG/0.3ML injection 2-pack Inject 0.15 mg into the muscle as needed for anaphylaxis May repeat one time in 5-15 minutes if response to initial dose is inadequate.       HYDROcodone-acetaminophen (NORCO) 5-325 MG tablet Take 1-2 tablets by mouth every 4 hours as needed for moderate to severe pain 12 tablet 0     senna-docusate (SENOKOT-S/PERICOLACE) 8.6-50 MG tablet Take 1-2 tablets by  mouth 2 times daily as needed for constipation 15 tablet 0     tamoxifen (NOLVADEX) 20 MG tablet Take 0.5 tablets (10 mg) by mouth 2 times daily 180 tablet 3     Gynecologic history: G3, P3.  Menarche age 14.  First child at age 27.  Premenopausal.  10 years of birth control use no history of hormone replacement therapy or fertility treatment    ALLERGIES:  Allergies   Allergen Reactions     Rosa-Kit Bee Sting      Bees          PAST MEDICAL HISTORY:  Past Medical History:   Diagnosis Date     Breast cancer (H)      Dermatitis          PAST SURGICAL HISTORY:  Past Surgical History:   Procedure Laterality Date     ANKLE SURGERY       BACK SURGERY       BIOPSY, BREAST, WITH RADIOFREQUENCY IDENTIFICATION Left 10/5/2022    Procedure: left breast tag localized lumpectomy;  Surgeon: Meme Ortega MD;  Location: [facility] OR     CYSTOSCOPY       EYE SURGERY       HIP SURGERY       MOUTH SURGERY           SOCIAL HISTORY:  Social History     Socioeconomic History     Marital status:      Spouse name: Not on file     Number of children: Not on file     Years of education: Not on file     Highest education level: Not on file   Occupational History     Not on file   Tobacco Use     Smoking status: Never     Smokeless tobacco: Never   Substance and Sexual Activity     Alcohol use: Not on file     Drug use: Never     Sexual activity: Not on file   Other Topics Concern     Not on file   Social History Narrative     Not on file     Social Determinants of Health     Financial Resource Strain: Not on file   Food Insecurity: Not on file   Transportation Needs: Not on file   Physical Activity: Not on file   Stress: Not on file   Social Connections: Not on file   Intimate Partner Violence: Not on file   Housing Stability: Not on file   Works as a       FAMILY HISTORY:  Her father is .  He  at age 81.  History of stroke.  No family history of first-degree relatives with breast cancer.  No family  "history of ovarian cancer.        PHYSICAL EXAM:  Vital signs:  /88   Pulse 103   Resp 16   Ht 1.626 m (5' 4\")   Wt 71.5 kg (157 lb 9.6 oz)   SpO2 98%   BMI 27.05 kg/m     ECO  GENERAL/CONSTITUTIONAL: No acute distress.  EYES: Pupils are equal, round, and react to light and accommodation. Extraocular movements intact.  No scleral icterus.  ENT/MOUTH: Neck supple. Oropharynx clear, no mucositis.  LYMPH: No anterior cervical, posterior cervical, supraclavicular, axillary or inguinal adenopathy.   RESPIRATORY: Clear to auscultation bilaterally. No crackles or wheezing.   CARDIOVASCULAR: Regular rate and rhythm without murmurs, gallops, or rubs.  GASTROINTESTINAL: No hepatosplenomegaly, masses, or tenderness. The patient has normal bowel sounds. No guarding.  No distention.  MUSCULOSKELETAL: Warm and well-perfused, no cyanosis, clubbing, or edema.  NEUROLOGIC: Cranial nerves II-XII are intact. Alert, oriented, answers questions appropriately.  INTEGUMENTARY: No rashes or jaundice.  GAIT: Steady, does not use assistive device  Breast: Status post left breast lumpectomy healed very well.  Right breast within normal limits no abnormalities noted    LABS:  No labs    PATHOLOGY:  As per HPI    IMAGING:  None    ASSESSMENT/PLAN:  Selma is a very pleasant 47-year-old premenopausal female who has a diagnosis of grade 3 DCIS status postlumpectomy and radiation left breast    I discussed with the patient that I would recommend tamoxifen splitting the dose in half to 10 mg twice daily initially she should start 10 mg a day for 2 to 3 weeks followed by 10 mg twice a day.  Side effects explained written information given.  She would like to proceed.  Not only way to decrease her risk of breast cancer recurrence in the same breast but also the opposite breast by 50%.  I would recommend alternating MRIs with mammograms as she has very heterogeneously dense breast.  We can get abbreviated MRIs.      1.  Breast DCIS: " Start tamoxifen 10 mg twice daily.  See me back in 2 months to assess tolerability.  Aspirin 81 mg a day for DVT prevention    2.  Screening would recommend MRIs alternating with mammograms.  She finished radiation in November 2022 her first mammogram on the left side will be in summer July  2023 then followed by bilateral mammogram in July 2023    See me in 3 months to assess tolerability      Kelvin Bansal MD  Hematology/Oncology  [facility]      Again, thank you for allowing me to participate in the care of your patient.        Sincerely,        Kelvin Bansal MD

## 2023-03-16 ENCOUNTER — TELEPHONE (OUTPATIENT)
Dept: ONCOLOGY | Facility: CLINIC | Age: 48
End: 2023-03-16
Payer: COMMERCIAL

## 2023-03-16 DIAGNOSIS — R79.89 ELEVATED LIVER FUNCTION TESTS: Primary | ICD-10-CM

## 2023-03-16 NOTE — TELEPHONE ENCOUNTER
Called Letitia regarding Dr. Bansal's recommendation:    Tell nella her LFTS are slightly elevated which could be a effect of tamoxifen vs a fatty liver id like to repat labs in 6 weeks, please chart check and let me know the results we can decide if need to ultarsound or not at that time     Letitia will return May 12 th at 0800 AM for repeat LFT. Maricarmen Treadwell RN,BSN,OCN,CBCN

## 2023-03-20 ENCOUNTER — TRANSFERRED RECORDS (OUTPATIENT)
Dept: HEALTH INFORMATION MANAGEMENT | Facility: CLINIC | Age: 48
End: 2023-03-20
Payer: COMMERCIAL

## 2023-03-21 ENCOUNTER — TELEPHONE (OUTPATIENT)
Dept: ONCOLOGY | Facility: CLINIC | Age: 48
End: 2023-03-21
Payer: COMMERCIAL

## 2023-03-21 NOTE — TELEPHONE ENCOUNTER
Letitia called clinic stating that she experienced heavy bleeding while on Tamoxifen. She has decreased her dose to 10 mg daily. Her primary care physician Dr. Gabriela Warner ([phone]) did a biopsy yesterday awaiting results. Writer stated to Selma to make sure that Dr. Warner's office sends her biopsy result to Dr. Bansal Writer will also follow up. Writer also stated to patient to currently hold her Tamoxifen until we get biopsy results back. Maricarmen Treadwell RN,BSN,OCN,CBCN

## 2023-03-29 NOTE — TELEPHONE ENCOUNTER
Endometrial Biopsy reviewed with Dr. Bansal from Dr. Otto's office which came back negative for atypia or malignancy. Message left with Selma that she can resule her Tamoxifen and that she should call clinic to schedule labs and follow up with Dr. Bansal in 6/23. Maricarmen Treadwell RN,BSN,OCN,CBCN

## 2023-04-03 ENCOUNTER — TRANSFERRED RECORDS (OUTPATIENT)
Dept: HEALTH INFORMATION MANAGEMENT | Facility: CLINIC | Age: 48
End: 2023-04-03

## 2023-04-03 ENCOUNTER — HOSPITAL ENCOUNTER (OUTPATIENT)
Dept: BONE DENSITY | Facility: CLINIC | Age: 48
Discharge: HOME | End: 2023-04-03
Attending: INTERNAL MEDICINE | Admitting: INTERNAL MEDICINE
Payer: COMMERCIAL

## 2023-04-03 DIAGNOSIS — C50.011 MALIGNANT NEOPLASM OF NIPPLE OF RIGHT BREAST IN FEMALE, UNSPECIFIED ESTROGEN RECEPTOR STATUS (H): ICD-10-CM

## 2023-04-03 PROCEDURE — 77080 DXA BONE DENSITY AXIAL: CPT

## 2023-04-25 ENCOUNTER — TRANSFERRED RECORDS (OUTPATIENT)
Dept: HEALTH INFORMATION MANAGEMENT | Facility: CLINIC | Age: 48
End: 2023-04-25
Payer: COMMERCIAL

## 2023-05-03 ENCOUNTER — TRANSFERRED RECORDS (OUTPATIENT)
Dept: HEALTH INFORMATION MANAGEMENT | Facility: CLINIC | Age: 48
End: 2023-05-03
Payer: COMMERCIAL

## 2023-05-12 ENCOUNTER — LAB (OUTPATIENT)
Dept: INFUSION THERAPY | Facility: CLINIC | Age: 48
End: 2023-05-12
Attending: INTERNAL MEDICINE
Payer: COMMERCIAL

## 2023-05-12 DIAGNOSIS — R79.89 ELEVATED LIVER FUNCTION TESTS: ICD-10-CM

## 2023-05-12 LAB
ALBUMIN SERPL BCG-MCNC: 4.1 G/DL (ref 3.5–5.2)
ALP SERPL-CCNC: 49 U/L (ref 35–104)
ALT SERPL W P-5'-P-CCNC: 67 U/L (ref 10–35)
AST SERPL W P-5'-P-CCNC: 41 U/L (ref 10–35)
BILIRUB DIRECT SERPL-MCNC: <0.2 MG/DL (ref 0–0.3)
BILIRUB SERPL-MCNC: 0.3 MG/DL
PROT SERPL-MCNC: 6.4 G/DL (ref 6.4–8.3)

## 2023-05-12 PROCEDURE — 36415 COLL VENOUS BLD VENIPUNCTURE: CPT

## 2023-05-12 PROCEDURE — 82040 ASSAY OF SERUM ALBUMIN: CPT | Performed by: INTERNAL MEDICINE

## 2023-05-12 NOTE — PROGRESS NOTES
Medical Assistant Note:  Selma Alonzo presents today for blood draw.    Patient seen by provider today: No.   present during visit today: Not Applicable.    Concerns: No Concerns.    Procedure:  Lab draw site: lac, Needle type: bf, Gauge: 23.    Post Assessment:  Labs drawn without difficulty: Yes.    Discharge Plan:  Departure Mode: Ambulatory.    Face to Face Time: 5 min.    Susi Pelletier, CMA

## 2023-05-16 ENCOUNTER — LAB REQUISITION (OUTPATIENT)
Dept: LAB | Facility: CLINIC | Age: 48
End: 2023-05-16
Payer: COMMERCIAL

## 2023-05-16 DIAGNOSIS — Z79.810 LONG TERM (CURRENT) USE OF SELECTIVE ESTROGEN RECEPTOR MODULATORS (SERMS): ICD-10-CM

## 2023-05-16 DIAGNOSIS — R93.89 ABNORMAL FINDINGS ON DIAGNOSTIC IMAGING OF OTHER SPECIFIED BODY STRUCTURES: ICD-10-CM

## 2023-05-16 PROCEDURE — 88305 TISSUE EXAM BY PATHOLOGIST: CPT | Mod: TC,ORL

## 2023-05-16 PROCEDURE — 88305 TISSUE EXAM BY PATHOLOGIST: CPT | Mod: 26 | Performed by: PATHOLOGY

## 2023-05-17 ENCOUNTER — TELEPHONE (OUTPATIENT)
Dept: ONCOLOGY | Facility: CLINIC | Age: 48
End: 2023-05-17

## 2023-05-17 DIAGNOSIS — R79.89 ABNORMAL LIVER FUNCTION TEST: Primary | ICD-10-CM

## 2023-05-17 NOTE — TELEPHONE ENCOUNTER
Called Selma regarding her recent liver function tests which are elevated. Reviewed with Dr. Bansal who ordered a liver ultrasound. Called Selma and she is aware and will call to schedule a liver ultrasound.     Selma also stated that she recently had a D and C done with Dr. Galaviz regarding vaginal bleeding and she is awaiting the results on that. Writer will look for those pathology results.     Selma also requested a letter for work for her recent absences due to the tests and follow up appointments that she has needed. Maricarmen Treadwell RN,BSN,OCN,CBCN

## 2023-05-19 LAB
PATH REPORT.COMMENTS IMP SPEC: NORMAL
PATH REPORT.COMMENTS IMP SPEC: NORMAL
PATH REPORT.FINAL DX SPEC: NORMAL
PATH REPORT.GROSS SPEC: NORMAL
PATH REPORT.MICROSCOPIC SPEC OTHER STN: NORMAL
PATH REPORT.RELEVANT HX SPEC: NORMAL
PHOTO IMAGE: NORMAL

## 2023-05-24 ENCOUNTER — HOSPITAL ENCOUNTER (OUTPATIENT)
Dept: ULTRASOUND IMAGING | Facility: CLINIC | Age: 48
Discharge: HOME | End: 2023-05-24
Attending: INTERNAL MEDICINE | Admitting: INTERNAL MEDICINE
Payer: COMMERCIAL

## 2023-05-24 DIAGNOSIS — R79.89 ABNORMAL LIVER FUNCTION TEST: ICD-10-CM

## 2023-05-24 PROCEDURE — 76705 ECHO EXAM OF ABDOMEN: CPT

## 2023-05-25 ENCOUNTER — HOSPITAL ENCOUNTER (OUTPATIENT)
Dept: MAMMOGRAPHY | Facility: CLINIC | Age: 48
Discharge: HOME | End: 2023-05-25
Attending: SURGERY | Admitting: SURGERY
Payer: COMMERCIAL

## 2023-05-25 ENCOUNTER — TELEPHONE (OUTPATIENT)
Dept: ONCOLOGY | Facility: CLINIC | Age: 48
End: 2023-05-25
Payer: COMMERCIAL

## 2023-05-25 DIAGNOSIS — Z12.31 VISIT FOR SCREENING MAMMOGRAM: ICD-10-CM

## 2023-05-25 DIAGNOSIS — N85.02 ENDOMETRIAL HYPERPLASIA WITH ATYPIA: Primary | ICD-10-CM

## 2023-05-25 PROCEDURE — 77067 SCR MAMMO BI INCL CAD: CPT

## 2023-05-25 NOTE — TELEPHONE ENCOUNTER
Received Selma's endometrial biopsy result with Dr. Bansal showing Endometrial atypical hyperplasia. Informed Selma to stop her Tamoxifen  And that Dr. Bansal would like her to have a referral with Gyn/onc in our clinic to discuss these pathology results. Also reviewed Selma's liver ultrasound result showing hepatic steatosis, recommend low fat, low carb diet.   Maricarmen Treadwell RN,BSN,OCN,CBCN

## 2023-05-26 ENCOUNTER — PATIENT OUTREACH (OUTPATIENT)
Dept: ONCOLOGY | Facility: CLINIC | Age: 48
End: 2023-05-26
Payer: COMMERCIAL

## 2023-05-26 NOTE — PROGRESS NOTES
New Patient Hematology / Oncology Nurse Navigator Note     Referral Date: 5/25/23    Referring provider:   Kelvin Bansal MD   [address] [facility] [address]   Phone: [phone]   Fax: [phone]       Referring Clinic/Organization: [facility]     Referred to: GynOnc    Requested provider (if applicable): First available - did not specify  [place] location requested    Evaluation for : Endometrial hyperplasia with atypia      Clinical History (per Nurse review of records provided):      5/16 path showing:  Final Diagnosis   Endometrium, curettage:  -Endometrial atypical hyperplasia/endometrial intraepithelial lesion (EIN) with squamous metaplasia, arising in endometrial polyp(s)    -- BOOKMARKED    5/4 Office Note from [facility] (scanned under Media) -- BOOKMARKED    3/20 path:     -- BOOKMARKED    Clinical Assessment / Barriers to Care (Per Nurse):  Pt lives in [place] [place] preferred     Records Location: [facility]   Faxed - Media tab/Scanned     Records Needed:   Need all recent records from [facility] (5/16 biopsy report, last PAP/HPV, anything else not already in Epic)    Additional testing needed prior to consult:   N/A    Referral updates and Plan:   OUTGOING CALL to pt:  Introduced my role as nurse navigator with [facility] Hemat[facility]/Oncology dept and that we have recd the referral for dx of Endometrial hyperplasia with atypia  from Dr Bansal.  Pt confirms they are aware of the referral and ready to schedule  Provided contact information if future questions arise.     -Transferred pt to NPS line [phone] to schedule appt per scheduling instructions.      Linda Mendoza, RAJWINDERN, RN, PHN, OCN  Hematology/Oncology Nurse Navigator  [facility]  [phone]

## 2023-06-01 NOTE — TELEPHONE ENCOUNTER
RECORDS STATUS - ALL OTHER DIAGNOSIS      RECORDS RECEIVED FROM: [facility]   DATE RECEIVED: 6/1   NOTES STATUS DETAILS   OFFICE NOTE from referring provider Epic Dr. Kelvin Bansal   OFFICE NOTE from medical oncologist [facility] Dr. Bansal: 3/10/23   OFFICE NOTE from Radiation Oncology [facility]/O 4/3/23   DISCHARGE SUMMARY from hospital [facility] 10/5/20   OPERATIVE REPORT Epic 10/5/22: Lumpectomy   MEDICATION LIST [facility]    LABS     PATHOLOGY REPORTS [facility] 5/16/23, 10/5/22: Surg Path   ANYTHING RELATED TO DIAGNOSIS Epic 3/15/23   IMAGING (NEED IMAGES & REPORT)     ULTRASOUND PACS Epic

## 2023-06-02 ENCOUNTER — OFFICE VISIT (OUTPATIENT)
Dept: SURGERY | Facility: CLINIC | Age: 48
End: 2023-06-02
Payer: COMMERCIAL

## 2023-06-02 VITALS
DIASTOLIC BLOOD PRESSURE: 76 MMHG | BODY MASS INDEX: 25.61 KG/M2 | WEIGHT: 150 LBS | SYSTOLIC BLOOD PRESSURE: 120 MMHG | HEIGHT: 64 IN | HEART RATE: 105 BPM

## 2023-06-02 DIAGNOSIS — Z91.89 AT HIGH RISK FOR BREAST CANCER: ICD-10-CM

## 2023-06-02 DIAGNOSIS — D05.12 DUCTAL CARCINOMA IN SITU (DCIS) OF LEFT BREAST: Primary | ICD-10-CM

## 2023-06-02 PROCEDURE — 99213 OFFICE O/P EST LOW 20 MIN: CPT | Performed by: SURGERY

## 2023-06-02 NOTE — PROGRESS NOTES
[facility] Follow Up Note    CHIEF COMPLAINT:  H/o left breast cancer    HISTORY OF PRESENT ILLNESS:  Selma Alonzo is a 47 year old female who is seen in follow up for left breast cancer.    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 all final margins are negative. She had adjuvant radiation. She is following with Dr. Bansal and is on tamoxifen.     She has seen PT/OT for shoulder discomfort and breast/axillary pain. She had bilateral screening mammogram on 5/25/2023 which was benign. She did have significant vaginal bleeding in March which led to a discharge with her OB/GYN and concern for atypia noted. She has an appt with Gyn/onc next week and would like to see Dr. Bansal prior to 6/20 as she is leaving for [employer]. She also has ongoing tightness in her left shoulder. She has worked with PT with minimal improvement. She has a rash under her left axilla. This was recently biopsied by dermatology and is benign. She is using topical steroid cream with minimal change.     REVIEW OF SYSTEMS:  Constitutional:  Negative for chills, fatigue, fever and weight change.  Eyes:  Negative for blurred vision, eye pain and photophobia.  ENT:  Negative for hearing problems, ENT pain, congestion, rhinorrhea, epistaxis, hoarseness and dental problems.  Cardiovascular:  Negative for chest pain, palpitations, tachycardia, orthopnea and edema.  Respiratory:  Negative for cough, dyspnea and hemoptysis.  Gastrointestinal:  Negative for abdominal pain, heartburn, constipation, diarrhea and stool changes.  Musculoskeletal:  Negative for arthralgias, back pain and myalgias.  Integumentary/Breast:  See HPI.    Past Medical History:   Diagnosis Date     Breast cancer (H)      Dermatitis        Past Surgical History:   Procedure Laterality Date     ANKLE SURGERY       BACK SURGERY       BIOPSY, BREAST, WITH  RADIOFREQUENCY IDENTIFICATION Left 10/5/2022    Procedure: left breast tag localized lumpectomy;  Surgeon: Meme Ortega MD;  Location: [facility] OR     CYSTOSCOPY       EYE SURGERY       HIP SURGERY       MOUTH SURGERY         No family history on file.    Social History     Tobacco Use     Smoking status: Never     Smokeless tobacco: Never   Vaping Use     Vaping status: Not on file   Substance Use Topics     Alcohol use: Not on file       There is no problem list on file for this patient.    Allergies   Allergen Reactions     Rosa-Kit Bee Sting      Bees      Current Outpatient Medications   Medication Sig Dispense Refill     betamethasone dipropionate (DIPROSONE) 0.05 % external cream Apply topically 2 times daily       EPINEPHrine (EPIPEN JR) 0.15 MG/0.3ML injection 2-pack Inject 0.15 mg into the muscle as needed for anaphylaxis May repeat one time in 5-15 minutes if response to initial dose is inadequate.       HYDROcodone-acetaminophen (NORCO) 5-325 MG tablet Take 1-2 tablets by mouth every 4 hours as needed for moderate to severe pain 12 tablet 0     senna-docusate (SENOKOT-S/PERICOLACE) 8.6-50 MG tablet Take 1-2 tablets by mouth 2 times daily as needed for constipation 15 tablet 0     tamoxifen (NOLVADEX) 20 MG tablet Take 0.5 tablets (10 mg) by mouth daily 90 tablet 1     tamoxifen (NOLVADEX) 20 MG tablet Take 0.5 tablets (10 mg) by mouth 2 times daily 180 tablet 3       EXAM:  GENERAL: healthy, alert and no distress   BREAST:  Mild Radiation changes on left breast. Well healed lumpectomy scar periareolar. No contour change on the breast. Mild fibrosis present. Rash in the lateral chest wall/axilla is mildly raised/erythematous.   There is no axillary or supraclavicular lymphadenopathy.  CARDIOVASCULAR:  RRR  RESPIRATORY: nonlabored breathing  NECK: Neck supple. No adenopathy. Thyroid symmetric, normal size  SKIN: No suspicious lesions or rashes  LYMPH: Normal cervical lymph nodes      ASSESSMENT/PLAN:  Selma  DANNA Alonzo is now 6 months s/p left partial mastectomy for microinvasive breast cancer. She has a benign appearing rash in her axilla which may be related to radiation. I would recommend trying triamcinolone topically BID for 7 days to see if this improves and ongoing follow up with dermatology. We reviewed her mammogram today which is benign as is her breast exam. I would recommend a breast MRI in 6 months as she is high risk off tamoxifen. I will see her after imaging. I sent Dr. Bansal a message with her concerns about trying to get in to clinic sometime soon to review what to do with her hormone blockade.     Meme Ortega MD  [facility]  [phone]        Please route or send letter to:  Primary Care Provider (PCP) and Referring Provider

## 2023-06-02 NOTE — LETTER
June 2, 2023          Gabriela Otto MD  [facility]  [address] [place] [address]      RE:   Selma Alonzo 1975      Dear Colleague,    Thank you for referring your patient, Selma Alonzo, to [facility] at [place] location. Please see a copy of my visit note below.    [facility] Follow Up Note     CHIEF COMPLAINT:  H/o left breast cancer     HISTORY OF PRESENT ILLNESS:  Selma Alonzo is a 47 year old female who is seen in follow up for left breast cancer.     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 all final margins are negative. She had adjuvant radiation. She is following with Dr. Bansal and is on tamoxifen.      She has seen PT/OT for shoulder discomfort and breast/axillary pain. She had bilateral screening mammogram on 5/25/2023 which was benign. She did have significant vaginal bleeding in March which led to a discharge with her OB/GYN and concern for atypia noted. She has an appt with Gyn/onc next week and would like to see Dr. Bansal prior to 6/20 as she is leaving for [employer]. She also has ongoing tightness in her left shoulder. She has worked with PT with minimal improvement. She has a rash under her left axilla. This was recently biopsied by dermatology and is benign. She is using topical steroid cream with minimal change.      ASSESSMENT/PLAN:  Selma Alonzo is now 6 months s/p left partial mastectomy for microinvasive breast cancer. She has a benign appearing rash in her axilla which may be related to radiation. I would recommend trying triamcinolone topically BID for 7 days to see if this improves and ongoing follow up with dermatology. We reviewed her mammogram today which is benign as is her breast exam. I would recommend a breast MRI in 6 months as she is high risk off tamoxifen. I will see her after imaging. I sent Dr. Bansal a message with her  concerns about trying to get in to clinic sometime soon to review what to do with her hormone blockade.        Again, thank you for allowing me to participate in the care of your patient.      Sincerely,      Meme Ortega MD
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
## 2023-06-07 ENCOUNTER — PATIENT OUTREACH (OUTPATIENT)
Dept: ONCOLOGY | Facility: CLINIC | Age: 48
End: 2023-06-07
Payer: COMMERCIAL

## 2023-06-07 ENCOUNTER — ONCOLOGY VISIT (OUTPATIENT)
Dept: ONCOLOGY | Facility: CLINIC | Age: 48
End: 2023-06-07
Attending: INTERNAL MEDICINE
Payer: COMMERCIAL

## 2023-06-07 ENCOUNTER — PRE VISIT (OUTPATIENT)
Dept: ONCOLOGY | Facility: CLINIC | Age: 48
End: 2023-06-07
Payer: COMMERCIAL

## 2023-06-07 VITALS
HEART RATE: 88 BPM | RESPIRATION RATE: 20 BRPM | DIASTOLIC BLOOD PRESSURE: 84 MMHG | HEIGHT: 63 IN | BODY MASS INDEX: 27.11 KG/M2 | TEMPERATURE: 97.8 F | SYSTOLIC BLOOD PRESSURE: 121 MMHG | OXYGEN SATURATION: 97 % | WEIGHT: 153 LBS

## 2023-06-07 DIAGNOSIS — N85.02 ENDOMETRIAL HYPERPLASIA WITH ATYPIA: ICD-10-CM

## 2023-06-07 PROCEDURE — 99215 OFFICE O/P EST HI 40 MIN: CPT | Performed by: OBSTETRICS & GYNECOLOGY

## 2023-06-07 PROCEDURE — 99417 PROLNG OP E/M EACH 15 MIN: CPT | Performed by: OBSTETRICS & GYNECOLOGY

## 2023-06-07 PROCEDURE — G0463 HOSPITAL OUTPT CLINIC VISIT: HCPCS | Performed by: OBSTETRICS & GYNECOLOGY

## 2023-06-07 RX ORDER — METRONIDAZOLE 500 MG/100ML
500 INJECTION, SOLUTION INTRAVENOUS
Status: CANCELLED | OUTPATIENT
Start: 2023-06-07

## 2023-06-07 ASSESSMENT — PAIN SCALES - GENERAL: PAINLEVEL: NO PAIN (0)

## 2023-06-07 NOTE — PROGRESS NOTES
GYN ONC Pre-Op Education - Nursing     Relevant Diagnosis: EIN- thickened endometrium    Teaching Topic: PALOMO TLH BSO possible cancer staging    Teaching Concerns addressed: Yes    Patient demonstrate understanding of the following:     Reason for the appointment, diagnosis and treatment plan:   Yes     Knowledge of proper use of medications and conditions for which they are ordered (with special attention to potential side effects or drug interactions): Yes     Which situations necessitate calling provider and whom to contact: Yes       Nutritional needs and diet plan:  Yes        Pain management techniques:  Yes    Diet:  Yes     Infection Prevention:  Patient demonstrate understanding of the following:     Pre-Op CHG Bathing Instructions: Yes    Surgical procedure site care taught:   Yes     Signs and symptoms of infection taught: Yes     Instructional Materials Used/Given:    Your Surgery Day    Preparing for Your Surgery    Showering before Surgery  o Provided patient with 2 bottles of CHG product.     Home Care after Major Surgery in the Abdomen or Pelvis    Home Care after Gynecologic Surgery    Eating After Surgery Gynecologic Oncology    Tips to Increase the Protein in Your Diet     Map    Phone number for [employer] [facility]     Visiting a Loved One in the Hospital during the COVID-19 Outbreak    (Consents to be signed in pre-op.)      Met with patient  for pre-operative teaching.  Surgery scheduled on 8/3/2023 with Dr. Manzano at [facility].       Pre-operative H&P:  Done day of surgery by Dr. Manzano    Pre-operative Labs/Imaging:  none      Reviewed pre-operative eating/drinking restrictions and showering instructions.    Reviewed medications that must be held for at least 7 days prior to surgery, including: NSAIDs, Aspirin (or any other product containing aspirin), Ibuprofen, Naproxen, and supplements/vitamins (Fish oil/Flax seed oil, and Multivitamin/Vit. E).    It is  scheduled as a Same Day surgery, so she was informed that she will need someone to drive her home after surgery and someone to stay with her for at least 24 hours after she arrives home.     Patient will need to see Dr. Manzano, 1-2 weeks after her surgery.  Post-op visit 8/16/23.    Reviewed post-operative care (including activity and lifting restrictions) and what to expect during recovery.    Reviewed signs and symptoms that would warrant contacting provider immediately and/or seeking emergency care.  o Reviewed after-hours/holiday/weekends contact: Patient should call clinic number and ask the answering service to connect them with the GYN Oncology Physician on-call.    Patient  had the opportunity to ask questions and all questions were answered to her satisfaction.  Patient verbalized understanding and agreement with plan.  Instructed to call the clinic with any questions, concerns, or worsening symptoms.     Thanks    Jeannine Victor RN Care Coordinator  [facility]  Ph.[phone] Fax. [phone]

## 2023-06-07 NOTE — PROGRESS NOTES
"Oncology Rooming Note    June 7, 2023 11:23 AM   Selma Alonzo is a 48 year old female who presents for:    Chief Complaint   Patient presents with     Oncology Clinic Visit     Initial Vitals: /84 (BP Location: Right arm, Patient Position: Sitting, Cuff Size: Adult Regular)   Pulse 88   Temp 97.8  F (36.6  C) (Oral)   Resp 20   Ht 1.61 m (5' 3.39\")   Wt 69.4 kg (153 lb)   SpO2 97%   BMI 26.77 kg/m   Estimated body mass index is 26.77 kg/m  as calculated from the following:    Height as of this encounter: 1.61 m (5' 3.39\").    Weight as of this encounter: 69.4 kg (153 lb). Body surface area is 1.76 meters squared.  No Pain (0) Comment: Data Unavailable   No LMP recorded.  Allergies reviewed: Yes  Medications reviewed: Yes    Medications: Medication refills not needed today.  Pharmacy name entered into [employer]: [id number] [place] [address]    Clinical concerns: no   Shawna Alvarez CMA              "

## 2023-06-08 ENCOUNTER — TELEPHONE (OUTPATIENT)
Dept: ONCOLOGY | Facility: CLINIC | Age: 48
End: 2023-06-08
Payer: COMMERCIAL

## 2023-06-08 NOTE — TELEPHONE ENCOUNTER
Called patient to schedule surgery with: Dr. Manzano     Surgery Date: 8/3/23     Location: [facility]    H&P: to be completed by Primary Care team; patient to schedule     Post-op: 8/16, in person visit    Patient will receive a phone call from pre-admission nurses 1-2 days prior to surgery with arrival and start time.    Patient aware times are subject to change up until day before surgery.     Patient questions/concerns: N/A     Surgery packet to be provided by [facility]. Appointment letter sent via [employer] on 6/8/2023 at 9:15 AM

## 2023-06-12 ENCOUNTER — TRANSFERRED RECORDS (OUTPATIENT)
Dept: HEALTH INFORMATION MANAGEMENT | Facility: CLINIC | Age: 48
End: 2023-06-12
Payer: COMMERCIAL

## 2023-06-14 ENCOUNTER — ONCOLOGY VISIT (OUTPATIENT)
Dept: ONCOLOGY | Facility: CLINIC | Age: 48
End: 2023-06-14
Attending: INTERNAL MEDICINE
Payer: COMMERCIAL

## 2023-06-14 VITALS
DIASTOLIC BLOOD PRESSURE: 88 MMHG | TEMPERATURE: 98.1 F | OXYGEN SATURATION: 98 % | BODY MASS INDEX: 26.84 KG/M2 | WEIGHT: 153.4 LBS | HEART RATE: 110 BPM | SYSTOLIC BLOOD PRESSURE: 135 MMHG | RESPIRATION RATE: 18 BRPM

## 2023-06-14 DIAGNOSIS — C50.011 MALIGNANT NEOPLASM OF NIPPLE OF RIGHT BREAST IN FEMALE, UNSPECIFIED ESTROGEN RECEPTOR STATUS (H): Primary | ICD-10-CM

## 2023-06-14 PROCEDURE — G0463 HOSPITAL OUTPT CLINIC VISIT: HCPCS | Performed by: INTERNAL MEDICINE

## 2023-06-14 PROCEDURE — 99214 OFFICE O/P EST MOD 30 MIN: CPT | Performed by: INTERNAL MEDICINE

## 2023-06-14 ASSESSMENT — PAIN SCALES - GENERAL: PAINLEVEL: NO PAIN (0)
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
## 2023-06-14 NOTE — PROGRESS NOTES
"Oncology Rooming Note    June 14, 2023 2:30 PM   Selma Alonzo is a 48 year old female who presents for:    Chief Complaint   Patient presents with     Oncology Clinic Visit     Initial Vitals: /88   Pulse 110   Temp 98.1  F (36.7  C) (Oral)   Resp 18   Wt 69.6 kg (153 lb 6.4 oz)   SpO2 98%   BMI 26.84 kg/m   Estimated body mass index is 26.84 kg/m  as calculated from the following:    Height as of 6/7/23: 1.61 m (5' 3.39\").    Weight as of this encounter: 69.6 kg (153 lb 6.4 oz). Body surface area is 1.76 meters squared.  No Pain (0) Comment: Data Unavailable   No LMP recorded.  Allergies reviewed: Yes  Medications reviewed: Yes    Medications: Medication refills not needed today.  Pharmacy name entered into [employer]: [id number] [place] [address]    Clinical concerns: no       Shari J. Schoenberger, YULIANA            "

## 2023-08-02 ENCOUNTER — ANESTHESIA EVENT (OUTPATIENT)
Dept: SURGERY | Facility: CLINIC | Age: 48
End: 2023-08-02
Payer: COMMERCIAL

## 2023-08-02 RX ORDER — TRIAMCINOLONE ACETONIDE 1 MG/G
CREAM TOPICAL 2 TIMES DAILY
COMMUNITY
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
## 2023-08-03 ENCOUNTER — ANESTHESIA (OUTPATIENT)
Dept: SURGERY | Facility: CLINIC | Age: 48
End: 2023-08-03
Payer: COMMERCIAL

## 2023-08-03 ENCOUNTER — HOSPITAL ENCOUNTER (OUTPATIENT)
Facility: CLINIC | Age: 48
Discharge: HOME | End: 2023-08-03
Attending: OBSTETRICS & GYNECOLOGY | Admitting: OBSTETRICS & GYNECOLOGY
Payer: COMMERCIAL

## 2023-08-03 VITALS
RESPIRATION RATE: 16 BRPM | TEMPERATURE: 97.4 F | HEART RATE: 95 BPM | BODY MASS INDEX: 26.29 KG/M2 | DIASTOLIC BLOOD PRESSURE: 74 MMHG | WEIGHT: 154 LBS | HEIGHT: 64 IN | SYSTOLIC BLOOD PRESSURE: 114 MMHG | OXYGEN SATURATION: 95 %

## 2023-08-03 DIAGNOSIS — N85.02 ENDOMETRIAL HYPERPLASIA WITH ATYPIA: ICD-10-CM

## 2023-08-03 DIAGNOSIS — Z90.710 S/P LAPAROSCOPIC HYSTERECTOMY: Primary | ICD-10-CM

## 2023-08-03 LAB
ABO/RH(D): NORMAL
ALBUMIN SERPL BCG-MCNC: 4.4 G/DL (ref 3.5–5.2)
ALP SERPL-CCNC: 56 U/L (ref 35–104)
ALT SERPL W P-5'-P-CCNC: 75 U/L (ref 0–50)
ANION GAP SERPL CALCULATED.3IONS-SCNC: 11 MMOL/L (ref 7–15)
ANTIBODY SCREEN: NEGATIVE
AST SERPL W P-5'-P-CCNC: 42 U/L (ref 0–45)
BILIRUB SERPL-MCNC: 0.4 MG/DL
BUN SERPL-MCNC: 12.2 MG/DL (ref 6–20)
CALCIUM SERPL-MCNC: 9.6 MG/DL (ref 8.6–10)
CHLORIDE SERPL-SCNC: 103 MMOL/L (ref 98–107)
CREAT SERPL-MCNC: 0.79 MG/DL (ref 0.51–0.95)
DEPRECATED HCO3 PLAS-SCNC: 25 MMOL/L (ref 22–29)
ERYTHROCYTE [DISTWIDTH] IN BLOOD BY AUTOMATED COUNT: 13.5 % (ref 10–15)
GFR SERPL CREATININE-BSD FRML MDRD: >90 ML/MIN/1.73M2
GLUCOSE SERPL-MCNC: 119 MG/DL (ref 70–99)
HCG UR QL: NEGATIVE
HCT VFR BLD AUTO: 41.2 % (ref 35–47)
HGB BLD-MCNC: 13.9 G/DL (ref 11.7–15.7)
MCH RBC QN AUTO: 29.8 PG (ref 26.5–33)
MCHC RBC AUTO-ENTMCNC: 33.7 G/DL (ref 31.5–36.5)
MCV RBC AUTO: 88 FL (ref 78–100)
PLATELET # BLD AUTO: 259 10E3/UL (ref 150–450)
POTASSIUM SERPL-SCNC: 4.2 MMOL/L (ref 3.4–5.3)
PROT SERPL-MCNC: 6.7 G/DL (ref 6.4–8.3)
RBC # BLD AUTO: 4.67 10E6/UL (ref 3.8–5.2)
SODIUM SERPL-SCNC: 139 MMOL/L (ref 136–145)
SPECIMEN EXPIRATION DATE: NORMAL
WBC # BLD AUTO: 8.9 10E3/UL (ref 4–11)

## 2023-08-03 PROCEDURE — 710N000012 HC RECOVERY PHASE 2, PER MINUTE: Performed by: OBSTETRICS & GYNECOLOGY

## 2023-08-03 PROCEDURE — 710N000009 HC RECOVERY PHASE 1, LEVEL 1, PER MIN: Performed by: OBSTETRICS & GYNECOLOGY

## 2023-08-03 PROCEDURE — 86850 RBC ANTIBODY SCREEN: CPT | Performed by: OBSTETRICS & GYNECOLOGY

## 2023-08-03 PROCEDURE — 250N000009 HC RX 250: Performed by: NURSE ANESTHETIST, CERTIFIED REGISTERED

## 2023-08-03 PROCEDURE — 360N000080 HC SURGERY LEVEL 7, PER MIN: Performed by: OBSTETRICS & GYNECOLOGY

## 2023-08-03 PROCEDURE — 258N000003 HC RX IP 258 OP 636: Performed by: NURSE ANESTHETIST, CERTIFIED REGISTERED

## 2023-08-03 PROCEDURE — 86901 BLOOD TYPING SEROLOGIC RH(D): CPT | Performed by: OBSTETRICS & GYNECOLOGY

## 2023-08-03 PROCEDURE — 88307 TISSUE EXAM BY PATHOLOGIST: CPT | Mod: 26 | Performed by: STUDENT IN AN ORGANIZED HEALTH CARE EDUCATION/TRAINING PROGRAM

## 2023-08-03 PROCEDURE — 999N000141 HC STATISTIC PRE-PROCEDURE NURSING ASSESSMENT: Performed by: OBSTETRICS & GYNECOLOGY

## 2023-08-03 PROCEDURE — 80053 COMPREHEN METABOLIC PANEL: CPT | Performed by: OBSTETRICS & GYNECOLOGY

## 2023-08-03 PROCEDURE — 258N000003 HC RX IP 258 OP 636: Performed by: ANESTHESIOLOGY

## 2023-08-03 PROCEDURE — 370N000017 HC ANESTHESIA TECHNICAL FEE, PER MIN: Performed by: OBSTETRICS & GYNECOLOGY

## 2023-08-03 PROCEDURE — 250N000025 HC SEVOFLURANE, PER MIN: Performed by: OBSTETRICS & GYNECOLOGY

## 2023-08-03 PROCEDURE — 88331 PATH CONSLTJ SURG 1 BLK 1SPC: CPT | Mod: 26 | Performed by: PATHOLOGY

## 2023-08-03 PROCEDURE — 85014 HEMATOCRIT: CPT | Performed by: OBSTETRICS & GYNECOLOGY

## 2023-08-03 PROCEDURE — 250N000011 HC RX IP 250 OP 636: Mod: JZ | Performed by: NURSE ANESTHETIST, CERTIFIED REGISTERED

## 2023-08-03 PROCEDURE — 258N000001 HC RX 258: Performed by: OBSTETRICS & GYNECOLOGY

## 2023-08-03 PROCEDURE — 250N000011 HC RX IP 250 OP 636: Performed by: NURSE ANESTHETIST, CERTIFIED REGISTERED

## 2023-08-03 PROCEDURE — 81025 URINE PREGNANCY TEST: CPT | Performed by: OBSTETRICS & GYNECOLOGY

## 2023-08-03 PROCEDURE — 36415 COLL VENOUS BLD VENIPUNCTURE: CPT | Performed by: OBSTETRICS & GYNECOLOGY

## 2023-08-03 PROCEDURE — 250N000009 HC RX 250: Performed by: OBSTETRICS & GYNECOLOGY

## 2023-08-03 PROCEDURE — 272N000001 HC OR GENERAL SUPPLY STERILE: Performed by: OBSTETRICS & GYNECOLOGY

## 2023-08-03 PROCEDURE — 250N000011 HC RX IP 250 OP 636: Performed by: OBSTETRICS & GYNECOLOGY

## 2023-08-03 PROCEDURE — 88331 PATH CONSLTJ SURG 1 BLK 1SPC: CPT | Mod: TC | Performed by: OBSTETRICS & GYNECOLOGY

## 2023-08-03 PROCEDURE — 250N000013 HC RX MED GY IP 250 OP 250 PS 637

## 2023-08-03 RX ORDER — DEXAMETHASONE SODIUM PHOSPHATE 4 MG/ML
INJECTION, SOLUTION INTRA-ARTICULAR; INTRALESIONAL; INTRAMUSCULAR; INTRAVENOUS; SOFT TISSUE PRN
Status: DISCONTINUED | OUTPATIENT
Start: 2023-08-03 | End: 2023-08-03

## 2023-08-03 RX ORDER — IBUPROFEN 400 MG/1
800 TABLET, FILM COATED ORAL ONCE
Status: DISCONTINUED | OUTPATIENT
Start: 2023-08-03 | End: 2023-08-03 | Stop reason: HOSPADM

## 2023-08-03 RX ORDER — KETOROLAC TROMETHAMINE 30 MG/ML
INJECTION, SOLUTION INTRAMUSCULAR; INTRAVENOUS PRN
Status: DISCONTINUED | OUTPATIENT
Start: 2023-08-03 | End: 2023-08-03

## 2023-08-03 RX ORDER — FENTANYL CITRATE 50 UG/ML
50 INJECTION, SOLUTION INTRAMUSCULAR; INTRAVENOUS EVERY 5 MIN PRN
Status: DISCONTINUED | OUTPATIENT
Start: 2023-08-03 | End: 2023-08-03 | Stop reason: HOSPADM

## 2023-08-03 RX ORDER — LIDOCAINE HYDROCHLORIDE 20 MG/ML
INJECTION, SOLUTION INFILTRATION; PERINEURAL PRN
Status: DISCONTINUED | OUTPATIENT
Start: 2023-08-03 | End: 2023-08-03

## 2023-08-03 RX ORDER — ONDANSETRON 4 MG/1
4 TABLET, ORALLY DISINTEGRATING ORAL EVERY 30 MIN PRN
Status: CANCELLED | OUTPATIENT
Start: 2023-08-03

## 2023-08-03 RX ORDER — OXYCODONE HYDROCHLORIDE 5 MG/1
5 TABLET ORAL
Status: COMPLETED | OUTPATIENT
Start: 2023-08-03 | End: 2023-08-03

## 2023-08-03 RX ORDER — METRONIDAZOLE 500 MG/100ML
500 INJECTION, SOLUTION INTRAVENOUS
Status: COMPLETED | OUTPATIENT
Start: 2023-08-03 | End: 2023-08-03

## 2023-08-03 RX ORDER — FENTANYL CITRATE 50 UG/ML
25 INJECTION, SOLUTION INTRAMUSCULAR; INTRAVENOUS EVERY 5 MIN PRN
Status: DISCONTINUED | OUTPATIENT
Start: 2023-08-03 | End: 2023-08-03 | Stop reason: HOSPADM

## 2023-08-03 RX ORDER — ONDANSETRON 2 MG/ML
4 INJECTION INTRAMUSCULAR; INTRAVENOUS EVERY 30 MIN PRN
Status: CANCELLED | OUTPATIENT
Start: 2023-08-03

## 2023-08-03 RX ORDER — ACETAMINOPHEN 325 MG/1
975 TABLET ORAL ONCE
Status: COMPLETED | OUTPATIENT
Start: 2023-08-03 | End: 2023-08-03

## 2023-08-03 RX ORDER — ONDANSETRON 2 MG/ML
4 INJECTION INTRAMUSCULAR; INTRAVENOUS EVERY 30 MIN PRN
Status: DISCONTINUED | OUTPATIENT
Start: 2023-08-03 | End: 2023-08-03 | Stop reason: HOSPADM

## 2023-08-03 RX ORDER — FENTANYL CITRATE 50 UG/ML
INJECTION, SOLUTION INTRAMUSCULAR; INTRAVENOUS PRN
Status: DISCONTINUED | OUTPATIENT
Start: 2023-08-03 | End: 2023-08-03

## 2023-08-03 RX ORDER — PROPOFOL 10 MG/ML
INJECTION, EMULSION INTRAVENOUS CONTINUOUS PRN
Status: DISCONTINUED | OUTPATIENT
Start: 2023-08-03 | End: 2023-08-03

## 2023-08-03 RX ORDER — SODIUM CHLORIDE, SODIUM LACTATE, POTASSIUM CHLORIDE, CALCIUM CHLORIDE 600; 310; 30; 20 MG/100ML; MG/100ML; MG/100ML; MG/100ML
INJECTION, SOLUTION INTRAVENOUS CONTINUOUS
Status: DISCONTINUED | OUTPATIENT
Start: 2023-08-03 | End: 2023-08-03 | Stop reason: HOSPADM

## 2023-08-03 RX ORDER — INDOCYANINE GREEN AND WATER 25 MG
KIT INJECTION PRN
Status: DISCONTINUED | OUTPATIENT
Start: 2023-08-03 | End: 2023-08-03 | Stop reason: HOSPADM

## 2023-08-03 RX ORDER — AMOXICILLIN 250 MG
1-2 CAPSULE ORAL 2 TIMES DAILY
Qty: 30 TABLET | Refills: 0 | Status: SHIPPED | OUTPATIENT
Start: 2023-08-03

## 2023-08-03 RX ORDER — ONDANSETRON 4 MG/1
4 TABLET, ORALLY DISINTEGRATING ORAL EVERY 8 HOURS PRN
Qty: 4 TABLET | Refills: 0 | Status: SHIPPED | OUTPATIENT
Start: 2023-08-03

## 2023-08-03 RX ORDER — ONDANSETRON 4 MG/1
4 TABLET, ORALLY DISINTEGRATING ORAL EVERY 30 MIN PRN
Status: DISCONTINUED | OUTPATIENT
Start: 2023-08-03 | End: 2023-08-03 | Stop reason: HOSPADM

## 2023-08-03 RX ORDER — ONDANSETRON 2 MG/ML
INJECTION INTRAMUSCULAR; INTRAVENOUS PRN
Status: DISCONTINUED | OUTPATIENT
Start: 2023-08-03 | End: 2023-08-03

## 2023-08-03 RX ORDER — PROPOFOL 10 MG/ML
INJECTION, EMULSION INTRAVENOUS PRN
Status: DISCONTINUED | OUTPATIENT
Start: 2023-08-03 | End: 2023-08-03

## 2023-08-03 RX ORDER — IBUPROFEN 600 MG/1
600 TABLET, FILM COATED ORAL EVERY 6 HOURS PRN
Qty: 12 TABLET | Refills: 0 | Status: SHIPPED | OUTPATIENT
Start: 2023-08-03

## 2023-08-03 RX ORDER — LIDOCAINE 40 MG/G
CREAM TOPICAL
Status: DISCONTINUED | OUTPATIENT
Start: 2023-08-03 | End: 2023-08-03 | Stop reason: HOSPADM

## 2023-08-03 RX ORDER — DEXMEDETOMIDINE HYDROCHLORIDE 4 UG/ML
INJECTION, SOLUTION INTRAVENOUS PRN
Status: DISCONTINUED | OUTPATIENT
Start: 2023-08-03 | End: 2023-08-03

## 2023-08-03 RX ORDER — HYDROMORPHONE HCL IN WATER/PF 6 MG/30 ML
0.2 PATIENT CONTROLLED ANALGESIA SYRINGE INTRAVENOUS EVERY 5 MIN PRN
Status: DISCONTINUED | OUTPATIENT
Start: 2023-08-03 | End: 2023-08-03 | Stop reason: HOSPADM

## 2023-08-03 RX ORDER — OXYCODONE HYDROCHLORIDE 5 MG/1
5 TABLET ORAL EVERY 6 HOURS PRN
Qty: 12 TABLET | Refills: 0 | Status: SHIPPED | OUTPATIENT
Start: 2023-08-03 | End: 2023-08-06

## 2023-08-03 RX ORDER — CEFAZOLIN SODIUM/WATER 2 G/20 ML
2 SYRINGE (ML) INTRAVENOUS SEE ADMIN INSTRUCTIONS
Status: DISCONTINUED | OUTPATIENT
Start: 2023-08-03 | End: 2023-08-03 | Stop reason: HOSPADM

## 2023-08-03 RX ORDER — HYDROMORPHONE HCL IN WATER/PF 6 MG/30 ML
0.4 PATIENT CONTROLLED ANALGESIA SYRINGE INTRAVENOUS EVERY 5 MIN PRN
Status: DISCONTINUED | OUTPATIENT
Start: 2023-08-03 | End: 2023-08-03 | Stop reason: HOSPADM

## 2023-08-03 RX ORDER — CEFAZOLIN SODIUM/WATER 2 G/20 ML
2 SYRINGE (ML) INTRAVENOUS
Status: COMPLETED | OUTPATIENT
Start: 2023-08-03 | End: 2023-08-03

## 2023-08-03 RX ORDER — ACETAMINOPHEN 325 MG/1
650 TABLET ORAL EVERY 6 HOURS PRN
Qty: 24 TABLET | Refills: 0 | Status: SHIPPED | OUTPATIENT
Start: 2023-08-03

## 2023-08-03 RX ADMIN — OXYCODONE HYDROCHLORIDE 5 MG: 5 TABLET ORAL at 11:40

## 2023-08-03 RX ADMIN — PHENYLEPHRINE HYDROCHLORIDE 100 MCG: 10 INJECTION INTRAVENOUS at 08:54

## 2023-08-03 RX ADMIN — ACETAMINOPHEN 975 MG: 325 TABLET, FILM COATED ORAL at 11:39

## 2023-08-03 RX ADMIN — ROCURONIUM BROMIDE 10 MG: 50 INJECTION, SOLUTION INTRAVENOUS at 09:19

## 2023-08-03 RX ADMIN — DEXMEDETOMIDINE HYDROCHLORIDE 12 MCG: 200 INJECTION INTRAVENOUS at 10:16

## 2023-08-03 RX ADMIN — ROCURONIUM BROMIDE 10 MG: 50 INJECTION, SOLUTION INTRAVENOUS at 09:00

## 2023-08-03 RX ADMIN — METRONIDAZOLE 500 MG: 500 INJECTION, SOLUTION INTRAVENOUS at 06:22

## 2023-08-03 RX ADMIN — HYDROMORPHONE HYDROCHLORIDE 0.5 MG: 1 INJECTION, SOLUTION INTRAMUSCULAR; INTRAVENOUS; SUBCUTANEOUS at 08:51

## 2023-08-03 RX ADMIN — DEXAMETHASONE SODIUM PHOSPHATE 4 MG: 4 INJECTION, SOLUTION INTRA-ARTICULAR; INTRALESIONAL; INTRAMUSCULAR; INTRAVENOUS; SOFT TISSUE at 08:31

## 2023-08-03 RX ADMIN — PROPOFOL 50 MCG/KG/MIN: 10 INJECTION, EMULSION INTRAVENOUS at 08:17

## 2023-08-03 RX ADMIN — FENTANYL CITRATE 50 MCG: 50 INJECTION, SOLUTION INTRAMUSCULAR; INTRAVENOUS at 08:14

## 2023-08-03 RX ADMIN — SUGAMMADEX 200 MG: 100 INJECTION, SOLUTION INTRAVENOUS at 10:41

## 2023-08-03 RX ADMIN — FENTANYL CITRATE 50 MCG: 50 INJECTION, SOLUTION INTRAMUSCULAR; INTRAVENOUS at 08:11

## 2023-08-03 RX ADMIN — ROCURONIUM BROMIDE 40 MG: 50 INJECTION, SOLUTION INTRAVENOUS at 08:16

## 2023-08-03 RX ADMIN — SODIUM CHLORIDE, POTASSIUM CHLORIDE, SODIUM LACTATE AND CALCIUM CHLORIDE: 600; 310; 30; 20 INJECTION, SOLUTION INTRAVENOUS at 09:32

## 2023-08-03 RX ADMIN — PROPOFOL 200 MG: 10 INJECTION, EMULSION INTRAVENOUS at 08:16

## 2023-08-03 RX ADMIN — SODIUM CHLORIDE, POTASSIUM CHLORIDE, SODIUM LACTATE AND CALCIUM CHLORIDE: 600; 310; 30; 20 INJECTION, SOLUTION INTRAVENOUS at 06:23

## 2023-08-03 RX ADMIN — LIDOCAINE HYDROCHLORIDE 100 MG: 20 INJECTION, SOLUTION INFILTRATION; PERINEURAL at 08:16

## 2023-08-03 RX ADMIN — ONDANSETRON 4 MG: 2 INJECTION INTRAMUSCULAR; INTRAVENOUS at 10:10

## 2023-08-03 RX ADMIN — Medication 2 G: at 08:08

## 2023-08-03 RX ADMIN — ROCURONIUM BROMIDE 10 MG: 50 INJECTION, SOLUTION INTRAVENOUS at 09:52

## 2023-08-03 RX ADMIN — MIDAZOLAM 2 MG: 1 INJECTION INTRAMUSCULAR; INTRAVENOUS at 08:08

## 2023-08-03 RX ADMIN — KETOROLAC TROMETHAMINE 30 MG: 30 INJECTION, SOLUTION INTRAMUSCULAR at 10:17

## 2023-08-03 ASSESSMENT — ACTIVITIES OF DAILY LIVING (ADL)
ADLS_ACUITY_SCORE: 35

## 2023-08-03 NOTE — OP NOTE
Procedure Date: 08/03/2023    PREOPERATIVE DIAGNOSES:     1.  History of estrogen receptor positive breast cancer.  2.  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  3.  BMI 26.      POSTOPERATIVE DIAGNOSES:  1.  History of estrogen receptor positive breast cancer.  2.  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  3.  BMI 26.      PROCEDURE PERFORMED:  Robotic-assisted total laparoscopic hysterectomy with bilateral salpingo-oophorectomy.    SURGEON:  Doris Manzano MD    ASSISTANT:  Dr. Halley Cifuentes (OB/GYN resident).    ANESTHESIA:  General.    ESTIMATED BLOOD LOSS:  50 mL.    COMPLICATIONS:  None.    SPECIMENS REMOVED:  Uterus, cervix, bilateral fallopian tubes and ovaries.    INDICATIONS:  The patient is a 48-year-old woman with a history of an estrogen receptor positive breast cancer.  She was on tamoxifen for approximately 6 months.  She developed abnormal bleeding and endometrial sampling revealed endometrial intraepithelial neoplasia.  She was brought to the hospital for further surgical diagnosis and management.    FINDINGS:  On bimanual exam, she had normal external genitalia.  The vagina was estrogenic.  The cervix was multiparous and smooth.  The uterus was mobile.  On diagnostic laparoscopy, she had normal upper abdomen.  She had no evidence of metastatic disease.  Her uterus was globally enlarged.  Her ovaries appeared normal bilaterally.  She had a simple what appeared to be a follicular cyst on her right ovary.    DESCRIPTION OF PROCEDURE:  After informed consent, the patient was brought to the operating room where general anesthesia was administered.  She was prepped and draped in the dorsal lithotomy position.  A Majano catheter was placed in her bladder.  A surgical timeout was performed, ensuring correct patient and procedure.  She was given Ancef and Flagyl for preoperative prophylactic antibiotics.  SCDs were  placed on her lower extremities.    I placed a speculum in the vagina.  I visualized the cervix.  I grasped the cervix with a tenaculum.  I did place a total of 3 mL of ICG dye in preparation for sentinel lymph node mapping in case a malignancy was encountered.  I then dilated the cervix to facilitate placement of a large VCare device, which was not sutured in.    Attention was then turned to the abdomen.  I placed a Veress needle through the umbilicus.  Opening pressure was 0.  Pneumoperitoneum was established.  I then used a Visiport technique to place a robotic trocar above the umbilicus.  Under direct visualization, I placed 2 left-sided robotic trocars, 1 right-sided robotic trocar and 1 right-sided 8 mm accessory trocar.  The patient was placed in steep Trendelenburg.  The robot was docked and I placed the instruments in the patient's pelvis.    I first started by performing sentinel lymph node mapping on both sides.  First, I opened the left retroperitoneal space.  I identified the left external iliac artery and vein as well as the left ureter.  I then followed the ICG channels to a bright lymph node in the left obturator space.  I did not remove this lymph node as I awaited the frozen section results.  In a similar fashion, I opened the right retroperitoneal space, identified the right ureter, the right external iliac artery and vein.  I then identified a hot ICG node also in the obturator space on this side.  I did not remove this node.    I then proceeded with the hysterectomy.  I skeletonized the bilateral ovarian vascular pedicles, all with good visualization of the bilateral ureters.  I then coagulated and cut these pedicles.  I created a bladder flap by tenting the anterior peritoneum and gently dissecting the bladder off the anterior cervix and vagina.  The uterine arteries were then skeletonized bilaterally.  I coagulated these and cut them.  I then created an anterior colpotomy and a posterior  colpotomy and continued this circumferentially, thus,  the specimen from the upper vagina.  I then easily removed the specimen from the pelvis.  I irrigated the pelvis.  We closed the vagina from right to left with a 0 Vicryl, Stratafix barbed suture.  I cut the suture flush with the vagina.  She had a small amount of oozing on the left vaginal cuff, which was controlled with pressure and 1 unit of Surgiflo.  At this point, frozen section returned as no evidence of malignancy.  Therefore, I did not perform any further surgical staging.    The instruments were removed.  The robot was undocked.  The patient was taken out of Trendelenburg.  The pneumoperitoneum was evacuated.  The trocars were removed and we closed the port sites with 4-0 Vicryl in a subcuticular fashion with Steri-Strips over the incision.    I inspected the vagina which was hemostatic.  I removed the occluder balloon and also removed the Majano.  The patient was then awoken from anesthesia and brought to the recovery room in stable condition.    Doris Manzano MD        D: 2023   T: 2023   MT: rao    Name:     FLAKO RAE  MRN:      [id number]        Account:        [id number]   :      1975           Procedure Date: 2023     Document: [id number]

## 2023-08-03 NOTE — ANESTHESIA POSTPROCEDURE EVALUATION
Patient: Selma Alonzo    Procedure: Procedure(s):  ROBOT-ASSISTED TOTAL  LAPAROSCOPIC HYSTERECTOMY WITH SALPINGO-OOPHORECTOMY       Anesthesia Type:  General    Note:  Disposition: Outpatient   Postop Pain Control: Uneventful            Sign Out: Well controlled pain   PONV: No   Neuro/Psych: Uneventful            Sign Out: Acceptable/Baseline neuro status   Airway/Respiratory: Uneventful            Sign Out: Acceptable/Baseline resp. status   CV/Hemodynamics: Uneventful            Sign Out: Acceptable CV status; No obvious hypovolemia; No obvious fluid overload   Other NRE: NONE   DID A NON-ROUTINE EVENT OCCUR? No           Last vitals:  Vitals Value Taken Time   /74 08/03/23 1130   Temp 36.6  C (97.8  F) 08/03/23 1056   Pulse 97 08/03/23 1144   Resp 19 08/03/23 1144   SpO2 97 % 08/03/23 1144   Vitals shown include unvalidated device data.    Electronically Signed By: Edwin Penn MD  August 3, 2023  11:46 AM

## 2023-08-03 NOTE — PROGRESS NOTES
"Gynecologic Oncology Postoperative Check Note  8/3/2023    S: Patient reports she is doing well. Pain has been okay, no new complaints yet. Just ambulated to bathroom, voided. Tolerating crackers and water without nausea or vomiting. Denies chest pain, shortness of breath, dizziness, or other concerns at this time.     O:  Vitals:    08/03/23 0554 08/03/23 1056 08/03/23 1100   BP: 131/88 111/59 100/66   Pulse: 86 101 93   Resp: 18 14 17   Temp: 98  F (36.7  C) 97.8  F (36.6  C)    TempSrc: Temporal Temporal    SpO2: 97% 97% 99%   Weight: 69.9 kg (154 lb)     Height: 1.626 m (5' 4\")         Gen: In no distress  Cardio: well perfused  Resp: no increased work of breathing  Abdomen: soft, appropriately tender  Extremities: Non-tender, no edema    A: 48 year old POD#0 s/p RA-TLH BSO. Doing well, meeting all goals.    Dz: EIN, frozen without evidence of disease   FEN: Advance as tolerated  Pain: tylenol, ibuprofen, oxycodone PRN  GI: no issues  : voiding spontaneously    Dispo: To home    Halley Cifuentes MD MPH  Obstetrics and Gyncology, PGY-3  August 3, 2023 , 11:45 AM   "

## 2023-08-03 NOTE — ANESTHESIA PROCEDURE NOTES
Airway       Patient location during procedure: OR       Procedure Start/Stop Times: 8/3/2023 8:19 AM  Staff -        Performed By: CRNA  Consent for Airway        Urgency: elective  Indications and Patient Condition       Indications for airway management: lyric-procedural       Induction type:intravenous       Mask difficulty assessment: 2 - vent by mask + OA or adjuvant +/- NMBA    Final Airway Details       Final airway type: endotracheal airway       Successful airway: ETT - single and Oral  Endotracheal Airway Details        ETT size (mm): 7.0       Cuffed: yes       Successful intubation technique: video laryngoscopy       VL Blade Size: Glidescope 3       Grade View of Cords: 1       Adjucts: stylet       Position: Right       Measured from: lips       Secured at (cm): 21       Bite block used: Soft    Post intubation assessment        Placement verified by: capnometry, equal breath sounds and chest rise        Number of attempts at approach: 1       Secured with: silk tape       Ease of procedure: easy       Dentition: Intact and Unchanged    Medication(s) Administered   Medication Administration Time: 8/3/2023 8:19 AM

## 2023-08-03 NOTE — DISCHARGE INSTRUCTIONS
**If you have questions or concerns about your procedure,   call Dr. Manzano at [phone]**       Same Day Surgery Discharge Instructions for  Sedation and General Anesthesia     It's not unusual to feel dizzy, light-headed or faint for up to 24 hours after surgery or while taking pain medication.  If you have these symptoms: sit for a few minutes before standing and have someone assist you when you get up to walk or use the bathroom.    You should rest and relax for the next 24 hours. We recommend you make arrangements to have an adult stay with you for at least 24 hours after your discharge.  Avoid hazardous and strenuous activity.    DO NOT DRIVE any vehicle or operate mechanical equipment for 24 hours following the end of your surgery.  Even though you may feel normal, your reactions may be affected by the medication you have received.    Do not drink alcoholic beverages for 24 hours following surgery.     Slowly progress to your regular diet as you feel able. It's not unusual to feel nauseated and/or vomit after receiving anesthesia.  If you develop these symptoms, drink clear liquids (apple juice, ginger ale, broth, 7-up, etc. ) until you feel better.  If your nausea and vomiting persists for 24 hours, please notify your surgeon.      All narcotic pain medications, along with inactivity and anesthesia, can cause constipation. Drinking plenty of liquids and increasing fiber intake will help.    For any questions of a medical nature, call your surgeon.    Do not make important decisions for 24 hours.    If you had general anesthesia, you may have a sore throat for a couple of days related to the breathing tube used during surgery.  You may use Cepacol lozenges to help with this discomfort.  If it worsens or if you develop a fever, contact your surgeon.     If you feel your pain is not well managed with the pain medications prescribed by your surgeon, please contact your surgeon's office to let them know so  they can address your concerns.     Today you received Toradol, an antiinflammatory medication similar to Ibuprofen.  You should not take other antiinflammatory medication, such as Ibuprofen, Motrin, Advil, Aleve, Naprosyn, etc until 4:20 PM.       Today you were given 975 mg of Tylenol at 11:40 AM. The recommended daily maximum dose is 4000 mg.     Reasons to contact your surgeon:    Signs of possible infection: Check your incision daily for redness, swelling, warmth, red streaks or foul drainage.   Elevated temperature.  Pain not controlled with pain medication and/or rest.   Uncontrolled nausea or vomiting.  Any questions or concerns.

## 2023-08-03 NOTE — BRIEF OP NOTE
[facility]    Brief Operative Note    Pre-operative diagnosis: Endometrial hyperplasia with atypia [N85.02]  Post-operative diagnosis Same as pre-operative diagnosis, benign on frozen    Procedure: Procedure(s):  ROBOT-ASSISTED TOTAL  LAPAROSCOPIC HYSTERECTOMY WITH SALPINGO-OOPHORECTOMY  Surgeon: Surgeon(s) and Role:     * Doris Manzano MD - Primary     * Halley Aranda MD - Assisting  Anesthesia: General   Estimated Blood Loss: 50 mL     Drains: None  Specimens:   ID Type Source Tests Collected by Time Destination   1 : Uterus, cervix, biateral tubes and ovaries Tissue Uterus, Cervix, Bilateral Fallopian Tubes & Ovaries SURGICAL PATHOLOGY EXAM Doris Manzano MD 8/3/2023  9:30 AM      Findings:   Bulbous appearing but otherwise normal appearing uterus, normal fallopian tubes. 1 cm mobile yellow mass on right ovarian, removed with specimen. Hemostatic pedicles and cuff at the end of the case.  .  Complications: None.  Implants: * No implants in log *

## 2023-08-03 NOTE — ANESTHESIA CARE TRANSFER NOTE
Patient: Selma Alonzo    Procedure: Procedure(s):  ROBOT-ASSISTED TOTAL  LAPAROSCOPIC HYSTERECTOMY WITH SALPINGO-OOPHORECTOMY       Diagnosis: Endometrial hyperplasia with atypia [N85.02]  Diagnosis Additional Information: No value filed.    Anesthesia Type:   General     Note:    Oropharynx: oropharynx clear of all foreign objects  Level of Consciousness: awake and drowsy  Oxygen Supplementation: face mask  Level of Supplemental Oxygen (L/min / FiO2): 6  Independent Airway: airway patency satisfactory and stable  Dentition: dentition unchanged  Vital Signs Stable: post-procedure vital signs reviewed and stable  Report to RN Given: handoff report given  Patient transferred to: PACU  Comments: To [facility] PACU: Arouses easily, good airway, 02 face mask, VSS  Report to RN  Handoff Report: Identifed the Patient, Identified the Reponsible Provider, Reviewed the pertinent medical history, Discussed the surgical course, Reviewed Intra-OP anesthesia mangement and issues during anesthesia, Set expectations for post-procedure period and Allowed opportunity for questions and acknowledgement of understanding      Vitals:  Vitals Value Taken Time   /59 08/03/23 1056   Temp     Pulse 93 08/03/23 1059   Resp 17 08/03/23 1059   SpO2 99 % 08/03/23 1059   Vitals shown include unvalidated device data.    Electronically Signed By: AMENA Thomas CRNA  August 3, 2023  11:01 AM

## 2023-08-08 ENCOUNTER — TELEPHONE (OUTPATIENT)
Dept: SURGERY | Facility: CLINIC | Age: 48
End: 2023-08-08
Payer: COMMERCIAL

## 2023-08-08 LAB
PATH REPORT.COMMENTS IMP SPEC: NORMAL
PATH REPORT.FINAL DX SPEC: NORMAL
PATH REPORT.GROSS SPEC: NORMAL
PATH REPORT.INTRAOP OBS SPEC DOC: NORMAL
PATH REPORT.MICROSCOPIC SPEC OTHER STN: NORMAL
PATH REPORT.RELEVANT HX SPEC: NORMAL
PHOTO IMAGE: NORMAL

## 2023-08-08 NOTE — TELEPHONE ENCOUNTER
Called to review pathology with nella    Benign pathology    Having discomfort and also with hot flashes and some pelvic pain but doing well overall    Followup as scheduled      Doris Manzano MD  Gynecologic Oncology  Pager [phone]

## 2023-08-16 ENCOUNTER — ONCOLOGY VISIT (OUTPATIENT)
Dept: ONCOLOGY | Facility: CLINIC | Age: 48
End: 2023-08-16
Attending: INTERNAL MEDICINE
Payer: COMMERCIAL

## 2023-08-16 VITALS
HEART RATE: 74 BPM | SYSTOLIC BLOOD PRESSURE: 125 MMHG | OXYGEN SATURATION: 99 % | DIASTOLIC BLOOD PRESSURE: 82 MMHG | TEMPERATURE: 98.1 F | RESPIRATION RATE: 18 BRPM

## 2023-08-16 VITALS
RESPIRATION RATE: 16 BRPM | HEART RATE: 78 BPM | WEIGHT: 148.6 LBS | DIASTOLIC BLOOD PRESSURE: 82 MMHG | SYSTOLIC BLOOD PRESSURE: 122 MMHG | OXYGEN SATURATION: 97 % | BODY MASS INDEX: 25.51 KG/M2 | TEMPERATURE: 98 F

## 2023-08-16 DIAGNOSIS — C50.111 MALIGNANT NEOPLASM OF CENTRAL PORTION OF RIGHT BREAST IN FEMALE, ESTROGEN RECEPTOR POSITIVE (H): Primary | ICD-10-CM

## 2023-08-16 DIAGNOSIS — Z17.0 MALIGNANT NEOPLASM OF CENTRAL PORTION OF RIGHT BREAST IN FEMALE, ESTROGEN RECEPTOR POSITIVE (H): Primary | ICD-10-CM

## 2023-08-16 DIAGNOSIS — N85.02 ENDOMETRIAL HYPERPLASIA WITH ATYPIA: Primary | ICD-10-CM

## 2023-08-16 LAB
ALBUMIN UR-MCNC: NEGATIVE MG/DL
APPEARANCE UR: CLEAR
BILIRUB UR QL STRIP: NEGATIVE
COLOR UR AUTO: NORMAL
GLUCOSE UR STRIP-MCNC: NEGATIVE MG/DL
HGB UR QL STRIP: NEGATIVE
KETONES UR STRIP-MCNC: NEGATIVE MG/DL
LEUKOCYTE ESTERASE UR QL STRIP: NEGATIVE
NITRATE UR QL: NEGATIVE
PH UR STRIP: 5.5 [PH] (ref 5–7)
SP GR UR STRIP: 1.02 (ref 1–1.03)
UROBILINOGEN UR STRIP-MCNC: NORMAL MG/DL

## 2023-08-16 PROCEDURE — 81003 URINALYSIS AUTO W/O SCOPE: CPT | Performed by: OBSTETRICS & GYNECOLOGY

## 2023-08-16 PROCEDURE — 99213 OFFICE O/P EST LOW 20 MIN: CPT | Mod: 27 | Performed by: INTERNAL MEDICINE

## 2023-08-16 PROCEDURE — 99213 OFFICE O/P EST LOW 20 MIN: CPT | Performed by: OBSTETRICS & GYNECOLOGY

## 2023-08-16 PROCEDURE — 99213 OFFICE O/P EST LOW 20 MIN: CPT | Mod: 24 | Performed by: OBSTETRICS & GYNECOLOGY

## 2023-08-16 PROCEDURE — 99215 OFFICE O/P EST HI 40 MIN: CPT | Performed by: INTERNAL MEDICINE

## 2023-08-16 ASSESSMENT — PAIN SCALES - GENERAL
PAINLEVEL: MILD PAIN (2)
PAINLEVEL: MILD PAIN (2)
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
## 2023-08-16 NOTE — PROGRESS NOTES
"Oncology Rooming Note    August 16, 2023 8:56 AM   Selma Alonzo is a 48 year old female who presents for:    Chief Complaint   Patient presents with    Oncology Clinic Visit     Initial Vitals: /82 (BP Location: Left arm, Patient Position: Sitting, Cuff Size: Adult Large)   Pulse 74   Temp 98.1  F (36.7  C) (Oral)   Resp 18   LMP  (LMP Unknown)   SpO2 99%  Estimated body mass index is 26.43 kg/m  as calculated from the following:    Height as of 8/3/23: 1.626 m (5' 4\").    Weight as of 8/3/23: 69.9 kg (154 lb). There is no height or weight on file to calculate BSA.  Mild Pain (2) Comment: Data Unavailable   No LMP recorded (lmp unknown). (Menstrual status: Irregular Periods).  Allergies reviewed: Yes  Medications reviewed: Yes    Medications: Medication refills not needed today.  Pharmacy name entered into [facility]: [id number] [place] [address]    Clinical concerns: no      Shawna Alvarez CMA              "
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
## 2023-08-16 NOTE — PROGRESS NOTES
"Oncology Rooming Note    August 16, 2023 9:44 AM   Selma Alonzo is a 48 year old female who presents for:    Chief Complaint   Patient presents with    Oncology Clinic Visit     Initial Vitals: LMP  (LMP Unknown)  Estimated body mass index is 26.43 kg/m  as calculated from the following:    Height as of 8/3/23: 1.626 m (5' 4\").    Weight as of 8/3/23: 69.9 kg (154 lb). There is no height or weight on file to calculate BSA.  Data Unavailable Comment: Data Unavailable   No LMP recorded (lmp unknown). (Menstrual status: Irregular Periods).  Allergies reviewed: Yes  Medications reviewed: Yes    Medications: Medication refills not needed today.  Pharm[facility] into [id number] [place] [address]    Clinical concerns: no       Shari J. Schoenberger, YULIANA            "
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
## 2023-09-16 ENCOUNTER — NURSE TRIAGE (OUTPATIENT)
Dept: NURSING | Facility: CLINIC | Age: 48
End: 2023-09-16
Payer: COMMERCIAL

## 2023-09-16 NOTE — TELEPHONE ENCOUNTER
"Pt calling that she had a hysterectomy on 8/3/23 and had a good recovery.  Pt states lower abdominal pain right side and then across abdominal, kyle vaginal discharge small amount yesterday.  Pt states pain with urination.  Pain \"5-6\"  on 0/10 pain scale.  Pt will follow Care Advice and be seen within 4 hrs.  Carolyn Wise RN  FNA Nurse Advisor    Reason for Disposition   [1] MILD-MODERATE pain AND [2] constant AND [3] present > 2 hours    Additional Information   Negative: Shock suspected (e.g., cold/pale/clammy skin, too weak to stand, low BP, rapid pulse)   Negative: Difficult to awaken or acting confused (e.g., disoriented, slurred speech)   Negative: Passed out (i.e., lost consciousness, collapsed and was not responding)   Negative: Sounds like a life-threatening emergency to the triager   Negative: Followed an abdomen (stomach) injury   Negative: Chest pain   Negative: [1] Abdominal pain AND [2] pregnant < 20 weeks   Negative: [1] Abdominal pain AND [2] pregnant 20 or more weeks   Negative: [1] Abdominal pain AND [2] postpartum (from 0 to 6 weeks after delivery)   Negative: Pain is mainly in upper abdomen  (if needed ask: \"is it mainly above the belly button?\")   Negative: Abdomen bloating or swelling are main symptoms   Negative: [1] SEVERE pain (e.g., excruciating) AND [2] present > 1 hour   Negative: [1] SEVERE pain AND [2] age > 60 years   Negative: [1] Vomiting AND [2] contains red blood or black (\"coffee ground\") material  (Exception: Few red streaks in vomit that only happened once.)   Negative: Blood in bowel movements  (Exception: Blood on surface of BM with constipation.)   Negative: Black or tarry bowel movements  (Exception: Chronic-unchanged black-grey BMs AND is taking iron pills or Pepto-Bismol.)   Negative: [1] Vomiting AND [2] contains bile (green color)   Negative: Patient sounds very sick or weak to the triager    Protocols used: Abdominal Pain - Female-A-AH    "

## 2023-11-09 ENCOUNTER — HOSPITAL ENCOUNTER (OUTPATIENT)
Dept: MRI IMAGING | Facility: CLINIC | Age: 48
Discharge: HOME | End: 2023-11-09
Attending: INTERNAL MEDICINE | Admitting: INTERNAL MEDICINE
Payer: COMMERCIAL

## 2023-11-09 DIAGNOSIS — C50.111 MALIGNANT NEOPLASM OF CENTRAL PORTION OF RIGHT BREAST IN FEMALE, ESTROGEN RECEPTOR POSITIVE (H): ICD-10-CM

## 2023-11-09 DIAGNOSIS — Z17.0 MALIGNANT NEOPLASM OF CENTRAL PORTION OF RIGHT BREAST IN FEMALE, ESTROGEN RECEPTOR POSITIVE (H): ICD-10-CM

## 2023-11-09 PROCEDURE — 77049 MRI BREAST C-+ W/CAD BI: CPT

## 2023-11-09 PROCEDURE — A9585 GADOBUTROL INJECTION: HCPCS | Performed by: INTERNAL MEDICINE

## 2023-11-09 PROCEDURE — 255N000002 HC RX 255 OP 636: Performed by: INTERNAL MEDICINE

## 2023-11-09 RX ORDER — GADOBUTROL 604.72 MG/ML
7 INJECTION INTRAVENOUS ONCE
Status: COMPLETED | OUTPATIENT
Start: 2023-11-09 | End: 2023-11-09

## 2023-11-09 RX ADMIN — GADOBUTROL 7 ML: 604.72 INJECTION INTRAVENOUS at 07:52

## 2023-11-10 ENCOUNTER — OFFICE VISIT (OUTPATIENT)
Dept: SURGERY | Facility: CLINIC | Age: 48
End: 2023-11-10
Payer: COMMERCIAL

## 2023-11-10 VITALS
DIASTOLIC BLOOD PRESSURE: 74 MMHG | WEIGHT: 150 LBS | SYSTOLIC BLOOD PRESSURE: 116 MMHG | RESPIRATION RATE: 16 BRPM | OXYGEN SATURATION: 96 % | HEART RATE: 93 BPM | HEIGHT: 64 IN | BODY MASS INDEX: 25.61 KG/M2

## 2023-11-10 DIAGNOSIS — D05.12 DUCTAL CARCINOMA IN SITU (DCIS) OF LEFT BREAST: Primary | ICD-10-CM

## 2023-11-10 PROCEDURE — 99212 OFFICE O/P EST SF 10 MIN: CPT | Performed by: SURGERY
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
## 2023-11-27 ENCOUNTER — TRANSFERRED RECORDS (OUTPATIENT)
Dept: HEALTH INFORMATION MANAGEMENT | Facility: CLINIC | Age: 48
End: 2023-11-27
Payer: COMMERCIAL

## 2023-12-07 ENCOUNTER — TRANSFERRED RECORDS (OUTPATIENT)
Dept: HEALTH INFORMATION MANAGEMENT | Facility: CLINIC | Age: 48
End: 2023-12-07
Payer: COMMERCIAL

## 2023-12-18 ENCOUNTER — TRANSFERRED RECORDS (OUTPATIENT)
Dept: HEALTH INFORMATION MANAGEMENT | Facility: CLINIC | Age: 48
End: 2023-12-18
Payer: COMMERCIAL

## 2024-02-11 ENCOUNTER — HEALTH MAINTENANCE LETTER (OUTPATIENT)
Age: 49
End: 2024-02-11

## 2024-02-12 ENCOUNTER — TRANSFERRED RECORDS (OUTPATIENT)
Dept: HEALTH INFORMATION MANAGEMENT | Facility: CLINIC | Age: 49
End: 2024-02-12
Payer: COMMERCIAL

## 2024-02-16 ENCOUNTER — LAB (OUTPATIENT)
Dept: INFUSION THERAPY | Facility: CLINIC | Age: 49
End: 2024-02-16
Payer: COMMERCIAL

## 2024-02-16 DIAGNOSIS — Z17.0 MALIGNANT NEOPLASM OF CENTRAL PORTION OF RIGHT BREAST IN FEMALE, ESTROGEN RECEPTOR POSITIVE (H): ICD-10-CM

## 2024-02-16 DIAGNOSIS — C50.111 MALIGNANT NEOPLASM OF CENTRAL PORTION OF RIGHT BREAST IN FEMALE, ESTROGEN RECEPTOR POSITIVE (H): ICD-10-CM

## 2024-02-16 LAB
ALBUMIN SERPL BCG-MCNC: 4.5 G/DL (ref 3.5–5.2)
ALP SERPL-CCNC: 94 U/L (ref 40–150)
ALT SERPL W P-5'-P-CCNC: 52 U/L (ref 0–50)
ANION GAP SERPL CALCULATED.3IONS-SCNC: 9 MMOL/L (ref 7–15)
AST SERPL W P-5'-P-CCNC: 29 U/L (ref 0–45)
BASOPHILS # BLD AUTO: 0.1 10E3/UL (ref 0–0.2)
BASOPHILS NFR BLD AUTO: 1 %
BILIRUB SERPL-MCNC: 0.3 MG/DL
BUN SERPL-MCNC: 10.6 MG/DL (ref 6–20)
CALCIUM SERPL-MCNC: 9.9 MG/DL (ref 8.6–10)
CANCER AG15-3 SERPL-ACNC: 10 U/ML
CEA SERPL-MCNC: 1.6 NG/ML
CHLORIDE SERPL-SCNC: 102 MMOL/L (ref 98–107)
CREAT SERPL-MCNC: 0.75 MG/DL (ref 0.51–0.95)
DEPRECATED HCO3 PLAS-SCNC: 30 MMOL/L (ref 22–29)
EGFRCR SERPLBLD CKD-EPI 2021: >90 ML/MIN/1.73M2
EOSINOPHIL # BLD AUTO: 0.2 10E3/UL (ref 0–0.7)
EOSINOPHIL NFR BLD AUTO: 3 %
ERYTHROCYTE [DISTWIDTH] IN BLOOD BY AUTOMATED COUNT: 12.5 % (ref 10–15)
GLUCOSE SERPL-MCNC: 112 MG/DL (ref 70–99)
HCT VFR BLD AUTO: 39.2 % (ref 35–47)
HGB BLD-MCNC: 13.7 G/DL (ref 11.7–15.7)
IMM GRANULOCYTES # BLD: 0.2 10E3/UL
IMM GRANULOCYTES NFR BLD: 2 %
LYMPHOCYTES # BLD AUTO: 2.1 10E3/UL (ref 0.8–5.3)
LYMPHOCYTES NFR BLD AUTO: 31 %
MCH RBC QN AUTO: 29.6 PG (ref 26.5–33)
MCHC RBC AUTO-ENTMCNC: 34.9 G/DL (ref 31.5–36.5)
MCV RBC AUTO: 85 FL (ref 78–100)
MONOCYTES # BLD AUTO: 0.6 10E3/UL (ref 0–1.3)
MONOCYTES NFR BLD AUTO: 8 %
NEUTROPHILS # BLD AUTO: 3.7 10E3/UL (ref 1.6–8.3)
NEUTROPHILS NFR BLD AUTO: 55 %
NRBC # BLD AUTO: 0 10E3/UL
NRBC BLD AUTO-RTO: 0 /100
PLATELET # BLD AUTO: 305 10E3/UL (ref 150–450)
POTASSIUM SERPL-SCNC: 4.1 MMOL/L (ref 3.4–5.3)
PROT SERPL-MCNC: 7.1 G/DL (ref 6.4–8.3)
RBC # BLD AUTO: 4.63 10E6/UL (ref 3.8–5.2)
SODIUM SERPL-SCNC: 141 MMOL/L (ref 135–145)
WBC # BLD AUTO: 6.8 10E3/UL (ref 4–11)

## 2024-02-16 PROCEDURE — 82378 CARCINOEMBRYONIC ANTIGEN: CPT | Performed by: INTERNAL MEDICINE

## 2024-02-16 PROCEDURE — 36415 COLL VENOUS BLD VENIPUNCTURE: CPT

## 2024-02-16 PROCEDURE — 85025 COMPLETE CBC W/AUTO DIFF WBC: CPT | Performed by: INTERNAL MEDICINE

## 2024-02-16 PROCEDURE — 86300 IMMUNOASSAY TUMOR CA 15-3: CPT | Performed by: INTERNAL MEDICINE

## 2024-02-16 PROCEDURE — 82247 BILIRUBIN TOTAL: CPT | Performed by: INTERNAL MEDICINE

## 2024-02-16 NOTE — PROGRESS NOTES
Medical Assistant Note:  Selma Alonzo presents today for blood draw.    Patient seen by provider today: No.   present during visit today: Not Applicable.    Concerns: No Concerns.    Procedure:  Lab draw site: LAC, Needle type: BF, Gauge: 23g.    Post Assessment:  Labs drawn without difficulty: Yes.    Discharge Plan:  Departure Mode: Ambulatory.    Face to Face Time: 5.    Tracey Le, CMA

## 2024-03-25 ENCOUNTER — VIRTUAL VISIT (OUTPATIENT)
Dept: ONCOLOGY | Facility: CLINIC | Age: 49
End: 2024-03-25
Attending: INTERNAL MEDICINE
Payer: COMMERCIAL

## 2024-03-25 VITALS — BODY MASS INDEX: 26.09 KG/M2 | WEIGHT: 152 LBS

## 2024-03-25 DIAGNOSIS — N89.8 VAGINAL DRYNESS: Primary | ICD-10-CM

## 2024-03-25 DIAGNOSIS — Z17.0 MALIGNANT NEOPLASM OF UPPER-INNER QUADRANT OF RIGHT BREAST IN FEMALE, ESTROGEN RECEPTOR POSITIVE (H): ICD-10-CM

## 2024-03-25 DIAGNOSIS — C50.211 MALIGNANT NEOPLASM OF UPPER-INNER QUADRANT OF RIGHT BREAST IN FEMALE, ESTROGEN RECEPTOR POSITIVE (H): ICD-10-CM

## 2024-03-25 PROCEDURE — 99214 OFFICE O/P EST MOD 30 MIN: CPT | Mod: 95 | Performed by: INTERNAL MEDICINE

## 2024-03-25 RX ORDER — FEZOLINETANT 45 MG/1
TABLET, FILM COATED ORAL
COMMUNITY
Start: 2024-01-25
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
## 2024-03-25 NOTE — PROGRESS NOTES
Selma is a 48 year old who is being evaluated via a billable video visit.    How would you like to obtain your AVS? MyChart  If the video visit is dropped, the invitation should be resent by: Send to e-mail at: [email or web address]    Will anyone else be joining your video visit? No    Video-Visit Details    Type of service:  Video Visit   Originating Location (pt. Location): Home    Distant Location (provider location):  On-site  Platform used for Video Visit: Ernie

## 2024-05-31 ENCOUNTER — ANCILLARY PROCEDURE (OUTPATIENT)
Dept: MAMMOGRAPHY | Facility: CLINIC | Age: 49
End: 2024-05-31
Attending: INTERNAL MEDICINE
Payer: COMMERCIAL

## 2024-05-31 DIAGNOSIS — Z17.0 MALIGNANT NEOPLASM OF UPPER-INNER QUADRANT OF RIGHT BREAST IN FEMALE, ESTROGEN RECEPTOR POSITIVE (H): ICD-10-CM

## 2024-05-31 DIAGNOSIS — C50.211 MALIGNANT NEOPLASM OF UPPER-INNER QUADRANT OF RIGHT BREAST IN FEMALE, ESTROGEN RECEPTOR POSITIVE (H): ICD-10-CM

## 2024-05-31 DIAGNOSIS — N89.8 VAGINAL DRYNESS: ICD-10-CM

## 2024-05-31 PROCEDURE — 77067 SCR MAMMO BI INCL CAD: CPT | Mod: TC | Performed by: RADIOLOGY

## 2024-05-31 PROCEDURE — 77063 BREAST TOMOSYNTHESIS BI: CPT | Mod: TC | Performed by: RADIOLOGY

## 2024-06-27 ENCOUNTER — TRANSFERRED RECORDS (OUTPATIENT)
Dept: HEALTH INFORMATION MANAGEMENT | Facility: CLINIC | Age: 49
End: 2024-06-27
Payer: COMMERCIAL

## 2024-07-11 ENCOUNTER — TELEPHONE (OUTPATIENT)
Dept: ONCOLOGY | Facility: CLINIC | Age: 49
End: 2024-07-11
Payer: COMMERCIAL

## 2024-07-11 DIAGNOSIS — M79.622 LEFT AXILLARY PAIN: Primary | ICD-10-CM

## 2024-07-11 NOTE — TELEPHONE ENCOUNTER
Selma is experiencing pain to her left axilla radiating down to her rib. She states that she does not see a bruise but it feels like one. This is the same side where she has Breast Cancer. Left breast diagnostic mammogram and left breast ultrasound will be ordered. Dr. Bansal is aware. Writer will follow up on results. Maricarmen Treadwell RN,BSN,OCN,CBCN

## 2024-07-17 ENCOUNTER — HOSPITAL ENCOUNTER (OUTPATIENT)
Dept: MAMMOGRAPHY | Facility: CLINIC | Age: 49
Discharge: HOME | End: 2024-07-17
Attending: INTERNAL MEDICINE
Payer: COMMERCIAL

## 2024-07-17 DIAGNOSIS — M79.622 LEFT AXILLARY PAIN: ICD-10-CM

## 2024-07-17 PROCEDURE — 77061 BREAST TOMOSYNTHESIS UNI: CPT | Mod: LT

## 2024-07-17 PROCEDURE — 76642 ULTRASOUND BREAST LIMITED: CPT | Mod: LT

## 2024-07-18 NOTE — TELEPHONE ENCOUNTER
IMPRESSION: BI-RADS CATEGORY: 2 - Benign Finding(s).     RECOMMENDED FOLLOW-UP: Annual Mammography, clinical follow-up.  Recommend routine annual screening mammography beginning at age 40 or  as discussed with your provider. Clinical follow-up is recommended,  with management dependent on the results/findings of the physical  examination.     The results and recommendation were communicated to the patient at the  conclusion of today's appointment.     MARIAN PAZ MD     Called Selma she is aware of her normal  Left Breast Ultrasound/diagnostic Mammogram report. She has appointment today with her primary care physician to address the pain in her axilla which radiates to her rib area. Maricarmen Treadwell RN,BSN,OCN,CBCN

## 2024-10-21 ENCOUNTER — TRANSFERRED RECORDS (OUTPATIENT)
Dept: HEALTH INFORMATION MANAGEMENT | Facility: CLINIC | Age: 49
End: 2024-10-21
Payer: COMMERCIAL

## 2024-11-27 ENCOUNTER — ONCOLOGY VISIT (OUTPATIENT)
Dept: ONCOLOGY | Facility: CLINIC | Age: 49
End: 2024-11-27
Attending: INTERNAL MEDICINE
Payer: COMMERCIAL

## 2024-11-27 ENCOUNTER — LAB (OUTPATIENT)
Dept: INFUSION THERAPY | Facility: CLINIC | Age: 49
End: 2024-11-27
Attending: INTERNAL MEDICINE
Payer: COMMERCIAL

## 2024-11-27 VITALS
WEIGHT: 158 LBS | OXYGEN SATURATION: 98 % | BODY MASS INDEX: 27.12 KG/M2 | DIASTOLIC BLOOD PRESSURE: 83 MMHG | SYSTOLIC BLOOD PRESSURE: 125 MMHG | RESPIRATION RATE: 18 BRPM | HEART RATE: 76 BPM | TEMPERATURE: 97.9 F

## 2024-11-27 DIAGNOSIS — Z91.89 AT HIGH RISK FOR BREAST CANCER: ICD-10-CM

## 2024-11-27 DIAGNOSIS — N89.8 VAGINAL DRYNESS: ICD-10-CM

## 2024-11-27 DIAGNOSIS — Z17.0 MALIGNANT NEOPLASM OF UPPER-INNER QUADRANT OF RIGHT BREAST IN FEMALE, ESTROGEN RECEPTOR POSITIVE (H): Primary | ICD-10-CM

## 2024-11-27 DIAGNOSIS — C50.211 MALIGNANT NEOPLASM OF UPPER-INNER QUADRANT OF RIGHT BREAST IN FEMALE, ESTROGEN RECEPTOR POSITIVE (H): Primary | ICD-10-CM

## 2024-11-27 LAB
ALBUMIN SERPL BCG-MCNC: 4.5 G/DL (ref 3.5–5.2)
ALP SERPL-CCNC: 129 U/L (ref 40–150)
ALT SERPL W P-5'-P-CCNC: 88 U/L (ref 0–50)
ANION GAP SERPL CALCULATED.3IONS-SCNC: 10 MMOL/L (ref 7–15)
AST SERPL W P-5'-P-CCNC: 47 U/L (ref 0–45)
BASOPHILS # BLD AUTO: 0.1 10E3/UL (ref 0–0.2)
BASOPHILS NFR BLD AUTO: 1 %
BILIRUB SERPL-MCNC: 0.4 MG/DL
BUN SERPL-MCNC: 12.7 MG/DL (ref 6–20)
CALCIUM SERPL-MCNC: 9.8 MG/DL (ref 8.8–10.4)
CANCER AG15-3 SERPL-ACNC: 10 U/ML
CEA SERPL-MCNC: 2.1 NG/ML
CHLORIDE SERPL-SCNC: 105 MMOL/L (ref 98–107)
CREAT SERPL-MCNC: 0.75 MG/DL (ref 0.51–0.95)
EGFRCR SERPLBLD CKD-EPI 2021: >90 ML/MIN/1.73M2
EOSINOPHIL # BLD AUTO: 0.2 10E3/UL (ref 0–0.7)
EOSINOPHIL NFR BLD AUTO: 3 %
ERYTHROCYTE [DISTWIDTH] IN BLOOD BY AUTOMATED COUNT: 13 % (ref 10–15)
GLUCOSE SERPL-MCNC: 145 MG/DL (ref 70–99)
HCO3 SERPL-SCNC: 27 MMOL/L (ref 22–29)
HCT VFR BLD AUTO: 41.7 % (ref 35–47)
HGB BLD-MCNC: 14.7 G/DL (ref 11.7–15.7)
IMM GRANULOCYTES # BLD: 0 10E3/UL
IMM GRANULOCYTES NFR BLD: 1 %
LYMPHOCYTES # BLD AUTO: 2.2 10E3/UL (ref 0.8–5.3)
LYMPHOCYTES NFR BLD AUTO: 36 %
MCH RBC QN AUTO: 29.7 PG (ref 26.5–33)
MCHC RBC AUTO-ENTMCNC: 35.3 G/DL (ref 31.5–36.5)
MCV RBC AUTO: 84 FL (ref 78–100)
MONOCYTES # BLD AUTO: 0.5 10E3/UL (ref 0–1.3)
MONOCYTES NFR BLD AUTO: 9 %
NEUTROPHILS # BLD AUTO: 3.1 10E3/UL (ref 1.6–8.3)
NEUTROPHILS NFR BLD AUTO: 51 %
NRBC # BLD AUTO: 0 10E3/UL
NRBC BLD AUTO-RTO: 0 /100
PLATELET # BLD AUTO: 274 10E3/UL (ref 150–450)
POTASSIUM SERPL-SCNC: 3.9 MMOL/L (ref 3.4–5.3)
PROT SERPL-MCNC: 7.2 G/DL (ref 6.4–8.3)
RBC # BLD AUTO: 4.95 10E6/UL (ref 3.8–5.2)
SODIUM SERPL-SCNC: 142 MMOL/L (ref 135–145)
WBC # BLD AUTO: 6.2 10E3/UL (ref 4–11)

## 2024-11-27 PROCEDURE — 99211 OFF/OP EST MAY X REQ PHY/QHP: CPT | Performed by: INTERNAL MEDICINE

## 2024-11-27 PROCEDURE — 85014 HEMATOCRIT: CPT | Performed by: INTERNAL MEDICINE

## 2024-11-27 PROCEDURE — 82378 CARCINOEMBRYONIC ANTIGEN: CPT | Performed by: INTERNAL MEDICINE

## 2024-11-27 PROCEDURE — 85048 AUTOMATED LEUKOCYTE COUNT: CPT | Performed by: INTERNAL MEDICINE

## 2024-11-27 PROCEDURE — 85004 AUTOMATED DIFF WBC COUNT: CPT | Performed by: INTERNAL MEDICINE

## 2024-11-27 PROCEDURE — 80053 COMPREHEN METABOLIC PANEL: CPT | Performed by: INTERNAL MEDICINE

## 2024-11-27 PROCEDURE — 86300 IMMUNOASSAY TUMOR CA 15-3: CPT | Performed by: INTERNAL MEDICINE

## 2024-11-27 PROCEDURE — 36415 COLL VENOUS BLD VENIPUNCTURE: CPT | Performed by: INTERNAL MEDICINE

## 2024-11-27 ASSESSMENT — PAIN SCALES - GENERAL: PAINLEVEL_OUTOF10: NO PAIN (0)

## 2024-11-27 NOTE — PROGRESS NOTES
"Oncology Rooming Note    November 27, 2024 9:27 AM   Selma Alonzo is a 49 year old female who presents for:    Chief Complaint   Patient presents with    Oncology Clinic Visit     Initial Vitals: /83 (BP Location: Left arm, Patient Position: Sitting, Cuff Size: Adult Large)   Pulse 76   Temp 97.9  F (36.6  C) (Oral)   Resp 18   Wt 71.7 kg (158 lb)   SpO2 98%   BMI 27.12 kg/m   Estimated body mass index is 27.12 kg/m  as calculated from the following:    Height as of 11/10/23: 1.626 m (5' 4\").    Weight as of this encounter: 71.7 kg (158 lb). Body surface area is 1.8 meters squared.  No Pain (0) Comment: Data Unavailable   No LMP recorded. (Menstrual status: Irregular Periods).  Allergies reviewed: Yes  Medications reviewed: Yes    Medications: Medication refills not needed today.  Pharmacy name entered into [facility]: [id number] [place] [address]    Frailty Screening:   Is the patient here for a new oncology consult visit in cancer care? 2. No      Clinical concerns: no     Shawna Alvarez CMA              "
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
## 2025-01-29 ENCOUNTER — ANCILLARY PROCEDURE (OUTPATIENT)
Dept: MRI IMAGING | Facility: CLINIC | Age: 50
End: 2025-01-29
Attending: INTERNAL MEDICINE
Payer: COMMERCIAL

## 2025-01-29 DIAGNOSIS — Z91.89 AT HIGH RISK FOR BREAST CANCER: ICD-10-CM

## 2025-01-29 DIAGNOSIS — Z17.0 MALIGNANT NEOPLASM OF UPPER-INNER QUADRANT OF RIGHT BREAST IN FEMALE, ESTROGEN RECEPTOR POSITIVE (H): ICD-10-CM

## 2025-01-29 DIAGNOSIS — C50.211 MALIGNANT NEOPLASM OF UPPER-INNER QUADRANT OF RIGHT BREAST IN FEMALE, ESTROGEN RECEPTOR POSITIVE (H): ICD-10-CM

## 2025-01-29 PROCEDURE — 255N000002 HC RX 255 OP 636: Performed by: INTERNAL MEDICINE

## 2025-01-29 PROCEDURE — A9585 GADOBUTROL INJECTION: HCPCS | Performed by: INTERNAL MEDICINE

## 2025-01-29 PROCEDURE — 77049 MRI BREAST C-+ W/CAD BI: CPT

## 2025-01-29 RX ORDER — GADOBUTROL 604.72 MG/ML
8 INJECTION INTRAVENOUS ONCE
Status: COMPLETED | OUTPATIENT
Start: 2025-01-29 | End: 2025-01-29

## 2025-01-29 RX ADMIN — GADOBUTROL 8 ML: 604.72 INJECTION INTRAVENOUS at 10:12

## 2025-02-26 ENCOUNTER — TRANSFERRED RECORDS (OUTPATIENT)
Dept: HEALTH INFORMATION MANAGEMENT | Facility: CLINIC | Age: 50
End: 2025-02-26
Payer: COMMERCIAL

## 2025-03-08 ENCOUNTER — HEALTH MAINTENANCE LETTER (OUTPATIENT)
Age: 50
End: 2025-03-08

## 2025-05-12 ENCOUNTER — TRANSFERRED RECORDS (OUTPATIENT)
Dept: HEALTH INFORMATION MANAGEMENT | Facility: CLINIC | Age: 50
End: 2025-05-12
Payer: COMMERCIAL

## 2025-06-02 ENCOUNTER — HOSPITAL ENCOUNTER (OUTPATIENT)
Dept: MAMMOGRAPHY | Facility: CLINIC | Age: 50
Discharge: HOME | End: 2025-06-02
Attending: INTERNAL MEDICINE | Admitting: INTERNAL MEDICINE
Payer: COMMERCIAL

## 2025-06-02 DIAGNOSIS — C50.211 MALIGNANT NEOPLASM OF UPPER-INNER QUADRANT OF RIGHT BREAST IN FEMALE, ESTROGEN RECEPTOR POSITIVE (H): ICD-10-CM

## 2025-06-02 DIAGNOSIS — Z17.0 MALIGNANT NEOPLASM OF UPPER-INNER QUADRANT OF RIGHT BREAST IN FEMALE, ESTROGEN RECEPTOR POSITIVE (H): ICD-10-CM

## 2025-06-02 PROCEDURE — 77063 BREAST TOMOSYNTHESIS BI: CPT

## 2025-06-05 ENCOUNTER — LAB (OUTPATIENT)
Dept: INFUSION THERAPY | Facility: CLINIC | Age: 50
End: 2025-06-05
Attending: INTERNAL MEDICINE
Payer: COMMERCIAL

## 2025-06-05 DIAGNOSIS — N89.8 VAGINAL DRYNESS: ICD-10-CM

## 2025-06-05 DIAGNOSIS — C50.211 MALIGNANT NEOPLASM OF UPPER-INNER QUADRANT OF RIGHT BREAST IN FEMALE, ESTROGEN RECEPTOR POSITIVE (H): ICD-10-CM

## 2025-06-05 DIAGNOSIS — Z17.0 MALIGNANT NEOPLASM OF UPPER-INNER QUADRANT OF RIGHT BREAST IN FEMALE, ESTROGEN RECEPTOR POSITIVE (H): ICD-10-CM

## 2025-06-05 LAB
ALBUMIN SERPL BCG-MCNC: 4.3 G/DL (ref 3.5–5.2)
ALP SERPL-CCNC: 102 U/L (ref 40–150)
ALT SERPL W P-5'-P-CCNC: 33 U/L (ref 0–50)
ANION GAP SERPL CALCULATED.3IONS-SCNC: 10 MMOL/L (ref 7–15)
AST SERPL W P-5'-P-CCNC: 22 U/L (ref 0–45)
BASOPHILS # BLD AUTO: 0.1 10E3/UL (ref 0–0.2)
BASOPHILS NFR BLD AUTO: 1 %
BILIRUB SERPL-MCNC: 0.3 MG/DL
BUN SERPL-MCNC: 13.4 MG/DL (ref 6–20)
CALCIUM SERPL-MCNC: 9.3 MG/DL (ref 8.8–10.4)
CANCER AG15-3 SERPL-ACNC: 9 U/ML
CEA SERPL-MCNC: 2.2 NG/ML
CHLORIDE SERPL-SCNC: 106 MMOL/L (ref 98–107)
CREAT SERPL-MCNC: 0.83 MG/DL (ref 0.51–0.95)
EGFRCR SERPLBLD CKD-EPI 2021: 86 ML/MIN/1.73M2
EOSINOPHIL # BLD AUTO: 0.3 10E3/UL (ref 0–0.7)
EOSINOPHIL NFR BLD AUTO: 4 %
ERYTHROCYTE [DISTWIDTH] IN BLOOD BY AUTOMATED COUNT: 12.1 % (ref 10–15)
GLUCOSE SERPL-MCNC: 141 MG/DL (ref 70–99)
HCO3 SERPL-SCNC: 25 MMOL/L (ref 22–29)
HCT VFR BLD AUTO: 43.3 % (ref 35–47)
HGB BLD-MCNC: 15.5 G/DL (ref 11.7–15.7)
IMM GRANULOCYTES # BLD: 0.1 10E3/UL
IMM GRANULOCYTES NFR BLD: 1 %
LYMPHOCYTES # BLD AUTO: 2.6 10E3/UL (ref 0.8–5.3)
LYMPHOCYTES NFR BLD AUTO: 34 %
MCH RBC QN AUTO: 30.3 PG (ref 26.5–33)
MCHC RBC AUTO-ENTMCNC: 35.8 G/DL (ref 31.5–36.5)
MCV RBC AUTO: 85 FL (ref 78–100)
MONOCYTES # BLD AUTO: 0.6 10E3/UL (ref 0–1.3)
MONOCYTES NFR BLD AUTO: 8 %
NEUTROPHILS # BLD AUTO: 3.9 10E3/UL (ref 1.6–8.3)
NEUTROPHILS NFR BLD AUTO: 52 %
NRBC # BLD AUTO: 0 10E3/UL
NRBC BLD AUTO-RTO: 0 /100
PLATELET # BLD AUTO: 262 10E3/UL (ref 150–450)
POTASSIUM SERPL-SCNC: 4.4 MMOL/L (ref 3.4–5.3)
PROT SERPL-MCNC: 7 G/DL (ref 6.4–8.3)
RBC # BLD AUTO: 5.12 10E6/UL (ref 3.8–5.2)
SODIUM SERPL-SCNC: 141 MMOL/L (ref 135–145)
WBC # BLD AUTO: 7.5 10E3/UL (ref 4–11)

## 2025-06-05 PROCEDURE — 36415 COLL VENOUS BLD VENIPUNCTURE: CPT

## 2025-06-05 PROCEDURE — 80053 COMPREHEN METABOLIC PANEL: CPT | Performed by: INTERNAL MEDICINE

## 2025-06-05 PROCEDURE — 85049 AUTOMATED PLATELET COUNT: CPT | Performed by: INTERNAL MEDICINE

## 2025-06-05 PROCEDURE — 86300 IMMUNOASSAY TUMOR CA 15-3: CPT | Performed by: INTERNAL MEDICINE

## 2025-06-05 PROCEDURE — 82378 CARCINOEMBRYONIC ANTIGEN: CPT | Performed by: INTERNAL MEDICINE

## 2025-06-05 NOTE — PROGRESS NOTES
Nursing Note:  Selma Alonzo presents today for labs.    Patient seen by provider today: No   present during visit today: Not Applicable.    Note: N/A.    Intravenous Access:  Lab draw site left AC, Needle type BF, Gauge 23.  Labs drawn without difficulty.    Discharge Plan:   Patient was sent home.    Kevin Newberry RN

## 2025-06-09 ENCOUNTER — ONCOLOGY VISIT (OUTPATIENT)
Dept: ONCOLOGY | Facility: CLINIC | Age: 50
End: 2025-06-09
Attending: INTERNAL MEDICINE
Payer: COMMERCIAL

## 2025-06-09 VITALS
DIASTOLIC BLOOD PRESSURE: 85 MMHG | WEIGHT: 153 LBS | RESPIRATION RATE: 16 BRPM | SYSTOLIC BLOOD PRESSURE: 117 MMHG | BODY MASS INDEX: 26.12 KG/M2 | HEIGHT: 64 IN | OXYGEN SATURATION: 96 % | HEART RATE: 76 BPM

## 2025-06-09 DIAGNOSIS — N89.8 VAGINAL DRYNESS: ICD-10-CM

## 2025-06-09 DIAGNOSIS — Z17.0 MALIGNANT NEOPLASM OF UPPER-INNER QUADRANT OF LEFT BREAST IN FEMALE, ESTROGEN RECEPTOR POSITIVE (H): ICD-10-CM

## 2025-06-09 DIAGNOSIS — C50.212 MALIGNANT NEOPLASM OF UPPER-INNER QUADRANT OF LEFT BREAST IN FEMALE, ESTROGEN RECEPTOR POSITIVE (H): ICD-10-CM

## 2025-06-09 DIAGNOSIS — G47.00 INSOMNIA, UNSPECIFIED TYPE: Primary | ICD-10-CM

## 2025-06-09 PROCEDURE — G2211 COMPLEX E/M VISIT ADD ON: HCPCS | Performed by: INTERNAL MEDICINE

## 2025-06-09 PROCEDURE — 99213 OFFICE O/P EST LOW 20 MIN: CPT | Performed by: INTERNAL MEDICINE

## 2025-06-09 PROCEDURE — 99214 OFFICE O/P EST MOD 30 MIN: CPT | Performed by: INTERNAL MEDICINE

## 2025-06-09 RX ORDER — ESTRADIOL 0.1 MG/G
2 CREAM VAGINAL
Qty: 72 G | Refills: 3 | Status: SHIPPED | OUTPATIENT
Start: 2025-06-09

## 2025-06-09 RX ORDER — TRAZODONE HYDROCHLORIDE 50 MG/1
50 TABLET ORAL AT BEDTIME
Qty: 90 TABLET | Refills: 3 | Status: SHIPPED | OUTPATIENT
Start: 2025-06-09

## 2025-06-09 RX ORDER — CELECOXIB 200 MG/1
CAPSULE ORAL
COMMUNITY
Start: 2025-04-17

## 2025-06-09 RX ORDER — METHOCARBAMOL 750 MG/1
1 TABLET, FILM COATED ORAL
COMMUNITY
Start: 2025-02-27

## 2025-06-09 ASSESSMENT — PAIN SCALES - GENERAL: PAINLEVEL_OUTOF10: NO PAIN (0)

## 2025-06-09 NOTE — PROGRESS NOTES
"Oncology Rooming Note    June 9, 2025 12:58 PM   Selma Alonzo is a 50 year old female who presents for:    Chief Complaint   Patient presents with    Oncology Clinic Visit     Initial Vitals: /85 (BP Location: Left arm, Patient Position: Sitting, Cuff Size: Adult Large)   Pulse 76   Resp 16   Ht 1.626 m (5' 4\")   Wt 69.4 kg (153 lb)   SpO2 96%   BMI 26.26 kg/m   Estimated body mass index is 26.26 kg/m  as calculated from the following:    Height as of this encounter: 1.626 m (5' 4\").    Weight as of this encounter: 69.4 kg (153 lb). Body surface area is 1.77 meters squared.  No Pain (0) Comment: Data Unavailable   No LMP recorded. (Menstrual status: Irregular Periods).  Allergies reviewed: Yes  Medications reviewed: Yes    Medications: Medication refills not needed today.  Pharmacy [facility] into [id number] [place] [address]    Frailty Screening:   Is the patient here for a new oncology consult visit in cancer care? 2. No    PHQ9:  Did this patient require a PHQ9?: Yes   If the patient required a PHQ9 assessment, did the results require a follow up with the Provider/Nurse?: No      Clinical concerns: None       Maricarmen Nuñez MA            "
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
## 2025-08-27 ENCOUNTER — TELEPHONE (OUTPATIENT)
Dept: ONCOLOGY | Facility: CLINIC | Age: 50
End: 2025-08-27
Payer: COMMERCIAL

## (undated) DEVICE — SUCTION CANISTER MEDIVAC LINER 3000ML W/LID 65651-530

## (undated) DEVICE — SOL WATER IRRIG 1000ML BOTTLE 2F7114

## (undated) DEVICE — SU MONOCRYL 4-0 PS-2 18" UND Y496G

## (undated) DEVICE — ESU ELEC BLADE 2.75" COATED/INSULATED E1455

## (undated) DEVICE — SOL NACL 0.9% IRRIG 1000ML BOTTLE 2F7124

## (undated) DEVICE — DAVINCI XI DRAPE ARM 470015

## (undated) DEVICE — LINEN TOWEL PACK X5 5464

## (undated) DEVICE — ESU GROUND PAD UNIVERSAL W/O CORD

## (undated) DEVICE — MARKER MARGIN MARKER STD 6 COLOR SGL USE MMS6

## (undated) DEVICE — DAVINCI XI GRASPER ENDOWRIST PROGRASP 470093

## (undated) DEVICE — SUCTION IRR STRYKERFLOW II W/TIP 250-070-520

## (undated) DEVICE — NDL SPINAL 22GA 3.5" QUINCKE 405181

## (undated) DEVICE — BNDG ELASTIC 6" DBL LENGTH UNSTERILE 6611-16

## (undated) DEVICE — PREP CHLORAPREP 26ML TINTED HI-LITE ORANGE 930815

## (undated) DEVICE — LOCALIZER SURGICAL PROBE

## (undated) DEVICE — SU STRATAFIX SYMMETRIC PDS PLUS #0 36CM CT-1 SXPP1A425

## (undated) DEVICE — PACK MINOR SBA15MIFSE

## (undated) DEVICE — DAVINCI HOT SHEARS TIP COVER  400180

## (undated) DEVICE — NDL INSUFFLATION 13GA 120MM C2201

## (undated) DEVICE — GLOVE PROTEXIS MICRO 6.0  2D73PM60

## (undated) DEVICE — DAVINCI XI SEAL UNIVERSAL 5-8MM 470361

## (undated) DEVICE — SYSTEM LAPAROVUE VISIBILITY LAPVUE10

## (undated) DEVICE — DAVINCI XI FCP BIPOLAR FENESTRATED 470205

## (undated) DEVICE — DRAPE BREAST/CHEST 29420

## (undated) DEVICE — KOH COLPOTOMIZER OCCLUDER  CPO-6

## (undated) DEVICE — DAVINCI XI MONOPOLAR SCISSORS HOT SHEARS 8MM 470179

## (undated) DEVICE — SYR BULB IRRIG 50ML LATEX FREE 0035280

## (undated) DEVICE — NDL 19GA 1.5"

## (undated) DEVICE — DRSG STERI STRIP 1/2X4" R1547

## (undated) DEVICE — NDL 25GA 1.5" 305127

## (undated) DEVICE — RETR ELEV / UTERINE MANIPULATOR V-CARE LG CUP 60-6085-202A

## (undated) DEVICE — GLOVE PROTEXIS BLUE W/NEU-THERA 6.5  2D73EB65

## (undated) DEVICE — SYR 10ML FINGER CONTROL W/O NDL 309695

## (undated) DEVICE — GOWN XLG DISP 9545

## (undated) DEVICE — KIT PATIENT POSITIONING PIGAZZI LATEX FREE 40580

## (undated) DEVICE — SET BREAST BIOPSY LOCALIZER 20 PROBE 8MM PENCIL 09-0006

## (undated) DEVICE — SU VICRYL 4-0 PS-2 18" UND J496H

## (undated) DEVICE — CATH TRAY FOLEY SURESTEP 16FR WDRAIN BAG STLK LATEX A300316A

## (undated) DEVICE — RX SURGIFLO HEMOSTATIC MATRIX W/THROMBIN 8ML 2994

## (undated) DEVICE — ENDO TROCAR CONMED AIRSEAL BLADELESS 08X120MM IAS8-120LP

## (undated) DEVICE — PACK DAVINCI GYN SMA15GDFS1

## (undated) DEVICE — CLIP ETHICON LIGACLIP SM BLUE LT100

## (undated) DEVICE — PAD CHUX UNDERPAD 23X24" 7136

## (undated) DEVICE — TUBING CONMED AIRSEAL SMOKE EVAC INSUFFLATION ASM-EVAC

## (undated) DEVICE — SYR 50ML LL W/O NDL 309653

## (undated) DEVICE — WIPES FOLEY CARE SURESTEP PROVON DFC100

## (undated) DEVICE — TUBING IV EXTENSION SET ANESTHESIA 34" MLL 2C6227

## (undated) DEVICE — ESU ELEC BLADE NN-STCK PLUMEPEN PRO 360D 10FT PLPRO4020

## (undated) DEVICE — SU VICRYL 3-0 SH 27" J316H

## (undated) DEVICE — LOCALIZER INSTRUMENT COVER KIT

## (undated) DEVICE — DAVINCI XI OBTURATOR BLADELESS 8MM 470359

## (undated) DEVICE — DAVINCI XI DRAPE COLUMN 470341

## (undated) DEVICE — GLOVE BIOGEL PI MICRO SZ 6.5 48565

## (undated) RX ORDER — INDOCYANINE GREEN AND WATER 25 MG
KIT INJECTION
Status: DISPENSED
Start: 2023-08-03

## (undated) RX ORDER — PROPOFOL 10 MG/ML
INJECTION, EMULSION INTRAVENOUS
Status: DISPENSED
Start: 2023-08-03

## (undated) RX ORDER — OXYCODONE HYDROCHLORIDE 5 MG/1
TABLET ORAL
Status: DISPENSED
Start: 2022-10-05

## (undated) RX ORDER — BUPIVACAINE HYDROCHLORIDE 2.5 MG/ML
INJECTION, SOLUTION EPIDURAL; INFILTRATION; INTRACAUDAL
Status: DISPENSED
Start: 2022-10-05

## (undated) RX ORDER — ACETAMINOPHEN 325 MG/1
TABLET ORAL
Status: DISPENSED
Start: 2023-08-03

## (undated) RX ORDER — FENTANYL CITRATE 50 UG/ML
INJECTION, SOLUTION INTRAMUSCULAR; INTRAVENOUS
Status: DISPENSED
Start: 2023-08-03

## (undated) RX ORDER — PROPOFOL 10 MG/ML
INJECTION, EMULSION INTRAVENOUS
Status: DISPENSED
Start: 2022-10-05

## (undated) RX ORDER — GLYCOPYRROLATE 0.2 MG/ML
INJECTION, SOLUTION INTRAMUSCULAR; INTRAVENOUS
Status: DISPENSED
Start: 2023-08-03

## (undated) RX ORDER — FENTANYL CITRATE 0.05 MG/ML
INJECTION, SOLUTION INTRAMUSCULAR; INTRAVENOUS
Status: DISPENSED
Start: 2022-10-05

## (undated) RX ORDER — OXYCODONE HYDROCHLORIDE 5 MG/1
TABLET ORAL
Status: DISPENSED
Start: 2023-08-03

## (undated) RX ORDER — KETOROLAC TROMETHAMINE 30 MG/ML
INJECTION, SOLUTION INTRAMUSCULAR; INTRAVENOUS
Status: DISPENSED
Start: 2023-08-03

## (undated) RX ORDER — DEXAMETHASONE SODIUM PHOSPHATE 4 MG/ML
INJECTION, SOLUTION INTRA-ARTICULAR; INTRALESIONAL; INTRAMUSCULAR; INTRAVENOUS; SOFT TISSUE
Status: DISPENSED
Start: 2023-08-03

## (undated) RX ORDER — FENTANYL CITRATE 50 UG/ML
INJECTION, SOLUTION INTRAMUSCULAR; INTRAVENOUS
Status: DISPENSED
Start: 2022-10-05

## (undated) RX ORDER — METRONIDAZOLE 500 MG/100ML
INJECTION, SOLUTION INTRAVENOUS
Status: DISPENSED
Start: 2023-08-03

## (undated) RX ORDER — CEFAZOLIN SODIUM/WATER 2 G/20 ML
SYRINGE (ML) INTRAVENOUS
Status: DISPENSED
Start: 2023-08-03

## (undated) RX ORDER — ONDANSETRON 2 MG/ML
INJECTION INTRAMUSCULAR; INTRAVENOUS
Status: DISPENSED
Start: 2023-08-03

## (undated) RX ORDER — LIDOCAINE HYDROCHLORIDE 10 MG/ML
INJECTION, SOLUTION EPIDURAL; INFILTRATION; INTRACAUDAL; PERINEURAL
Status: DISPENSED
Start: 2022-10-05

## (undated) RX ORDER — HYDROMORPHONE HYDROCHLORIDE 1 MG/ML
INJECTION, SOLUTION INTRAMUSCULAR; INTRAVENOUS; SUBCUTANEOUS
Status: DISPENSED
Start: 2023-08-03